# Patient Record
Sex: MALE | Race: WHITE | Employment: FULL TIME | URBAN - METROPOLITAN AREA
[De-identification: names, ages, dates, MRNs, and addresses within clinical notes are randomized per-mention and may not be internally consistent; named-entity substitution may affect disease eponyms.]

---

## 2021-06-25 ENCOUNTER — HOSPITAL ENCOUNTER (INPATIENT)
Age: 46
LOS: 3 days | Discharge: HOME | DRG: 917 | End: 2021-06-28
Attending: STUDENT IN AN ORGANIZED HEALTH CARE EDUCATION/TRAINING PROGRAM | Admitting: INTERNAL MEDICINE
Payer: COMMERCIAL

## 2021-06-25 ENCOUNTER — APPOINTMENT (OUTPATIENT)
Dept: CT IMAGING | Age: 46
DRG: 917 | End: 2021-06-25
Payer: COMMERCIAL

## 2021-06-25 ENCOUNTER — APPOINTMENT (OUTPATIENT)
Dept: GENERAL RADIOLOGY | Age: 46
DRG: 917 | End: 2021-06-25
Payer: COMMERCIAL

## 2021-06-25 DIAGNOSIS — T50.904A DRUG OVERDOSE, UNDETERMINED INTENT, INITIAL ENCOUNTER: Primary | ICD-10-CM

## 2021-06-25 DIAGNOSIS — F19.10 POLYSUBSTANCE ABUSE (HCC): ICD-10-CM

## 2021-06-25 PROBLEM — T50.901A DRUG OVERDOSE, ACCIDENTAL OR UNINTENTIONAL, INITIAL ENCOUNTER: Status: ACTIVE | Noted: 2021-06-25

## 2021-06-25 LAB
ACETAMINOPHEN LEVEL: <5 UG/ML (ref 10–30)
ALBUMIN SERPL-MCNC: 3.9 G/DL (ref 3.4–5)
ALP BLD-CCNC: 90 U/L (ref 40–129)
ALT SERPL-CCNC: 45 U/L (ref 10–40)
AMMONIA: 13 UMOL/L (ref 16–60)
AMPHETAMINE SCREEN, URINE: POSITIVE
ANION GAP SERPL CALCULATED.3IONS-SCNC: 13 MMOL/L (ref 3–16)
ANISOCYTOSIS: ABNORMAL
AST SERPL-CCNC: 28 U/L (ref 15–37)
BARBITURATE SCREEN URINE: ABNORMAL
BASE EXCESS ARTERIAL: 3.4 MMOL/L (ref -3–3)
BASE EXCESS VENOUS: 5.3 MMOL/L
BASOPHILS ABSOLUTE: 0.1 K/UL (ref 0–0.2)
BASOPHILS RELATIVE PERCENT: 0.6 %
BENZODIAZEPINE SCREEN, URINE: POSITIVE
BILIRUB SERPL-MCNC: 0.4 MG/DL (ref 0–1)
BILIRUBIN DIRECT: <0.2 MG/DL (ref 0–0.3)
BILIRUBIN URINE: NEGATIVE
BILIRUBIN, INDIRECT: ABNORMAL MG/DL (ref 0–1)
BLOOD, URINE: NEGATIVE
BUN BLDV-MCNC: 13 MG/DL (ref 7–20)
CALCIUM SERPL-MCNC: 9.3 MG/DL (ref 8.3–10.6)
CANNABINOID SCREEN URINE: ABNORMAL
CARBOXYHEMOGLOBIN ARTERIAL: 1.2 % (ref 0–1.5)
CARBOXYHEMOGLOBIN: 1.3 %
CHLORIDE BLD-SCNC: 103 MMOL/L (ref 99–110)
CLARITY: CLEAR
CO2: 25 MMOL/L (ref 21–32)
COCAINE METABOLITE SCREEN URINE: ABNORMAL
COLOR: ABNORMAL
COMMENT UA: ABNORMAL
CREAT SERPL-MCNC: 0.9 MG/DL (ref 0.9–1.3)
EKG ATRIAL RATE: 56 BPM
EKG DIAGNOSIS: NORMAL
EKG P AXIS: 30 DEGREES
EKG P-R INTERVAL: 174 MS
EKG Q-T INTERVAL: 490 MS
EKG QRS DURATION: 92 MS
EKG QTC CALCULATION (BAZETT): 472 MS
EKG R AXIS: 41 DEGREES
EKG T AXIS: 24 DEGREES
EKG VENTRICULAR RATE: 56 BPM
EOSINOPHILS ABSOLUTE: 0.4 K/UL (ref 0–0.6)
EOSINOPHILS RELATIVE PERCENT: 3.9 %
EPITHELIAL CELLS, UA: 9 /HPF (ref 0–5)
ETHANOL: NORMAL MG/DL (ref 0–0.08)
FERRITIN: 63.2 NG/ML (ref 30–400)
GFR AFRICAN AMERICAN: >60
GFR NON-AFRICAN AMERICAN: >60
GLUCOSE BLD-MCNC: 101 MG/DL (ref 70–99)
GLUCOSE BLD-MCNC: 102 MG/DL (ref 70–99)
GLUCOSE BLD-MCNC: 109 MG/DL (ref 70–99)
GLUCOSE URINE: NEGATIVE MG/DL
HCO3 ARTERIAL: 28.3 MMOL/L (ref 21–29)
HCO3 VENOUS: 32 MMOL/L (ref 23–29)
HCT VFR BLD CALC: 35.9 % (ref 40.5–52.5)
HEMATOLOGY PATH CONSULT: YES
HEMOGLOBIN, ART, EXTENDED: 11.4 G/DL (ref 13.5–17.5)
HEMOGLOBIN: 11 G/DL (ref 13.5–17.5)
HYALINE CASTS: 19 /LPF (ref 0–8)
IRON SATURATION: 9 % (ref 20–50)
IRON: 26 UG/DL (ref 59–158)
KETONES, URINE: NEGATIVE MG/DL
LACTIC ACID: 2.3 MMOL/L (ref 0.4–2)
LEUKOCYTE ESTERASE, URINE: NEGATIVE
LYMPHOCYTES ABSOLUTE: 2.2 K/UL (ref 1–5.1)
LYMPHOCYTES RELATIVE PERCENT: 22.1 %
Lab: ABNORMAL
MCH RBC QN AUTO: 17.6 PG (ref 26–34)
MCHC RBC AUTO-ENTMCNC: 30.6 G/DL (ref 31–36)
MCV RBC AUTO: 57.7 FL (ref 80–100)
METHADONE SCREEN, URINE: POSITIVE
METHEMOGLOBIN ARTERIAL: 0.7 %
METHEMOGLOBIN VENOUS: 0.7 %
MICROCYTES: ABNORMAL
MICROSCOPIC EXAMINATION: YES
MONOCYTES ABSOLUTE: 0.8 K/UL (ref 0–1.3)
MONOCYTES RELATIVE PERCENT: 7.8 %
NEUTROPHILS ABSOLUTE: 6.4 K/UL (ref 1.7–7.7)
NEUTROPHILS RELATIVE PERCENT: 65.6 %
NITRITE, URINE: NEGATIVE
O2 CONTENT ARTERIAL: 16 ML/DL
O2 SAT, ARTERIAL: 98.4 %
O2 SAT, VEN: 43 %
O2 THERAPY: ABNORMAL
O2 THERAPY: ABNORMAL
OPIATE SCREEN URINE: ABNORMAL
OXYCODONE URINE: ABNORMAL
PCO2 ARTERIAL: 42.9 MMHG (ref 35–45)
PCO2, VEN: 54.1 MMHG (ref 40–50)
PDW BLD-RTO: 17.9 % (ref 12.4–15.4)
PERFORMED ON: ABNORMAL
PERFORMED ON: ABNORMAL
PH ARTERIAL: 7.43 (ref 7.35–7.45)
PH UA: 6.5
PH UA: 6.5 (ref 5–8)
PH VENOUS: 7.38 (ref 7.35–7.45)
PHENCYCLIDINE SCREEN URINE: POSITIVE
PLATELET # BLD: 291 K/UL (ref 135–450)
PLATELET SLIDE REVIEW: ADEQUATE
PMV BLD AUTO: 8.4 FL (ref 5–10.5)
PO2 ARTERIAL: 94.2 MMHG (ref 75–108)
PO2, VEN: <30 MMHG
POTASSIUM REFLEX MAGNESIUM: 3.8 MMOL/L (ref 3.5–5.1)
PROCALCITONIN: 0.05 NG/ML (ref 0–0.15)
PROLACTIN: 4.7 NG/ML
PROPOXYPHENE SCREEN: ABNORMAL
PROTEIN UA: 30 MG/DL
RBC # BLD: 6.21 M/UL (ref 4.2–5.9)
RBC UA: 1 /HPF (ref 0–4)
SALICYLATE, SERUM: <0.3 MG/DL (ref 15–30)
SARS-COV-2, NAAT: NOT DETECTED
SLIDE REVIEW: ABNORMAL
SODIUM BLD-SCNC: 141 MMOL/L (ref 136–145)
SPECIFIC GRAVITY UA: 1.03 (ref 1–1.03)
TCO2 ARTERIAL: 29.6 MMOL/L
TCO2 CALC VENOUS: 33 MMOL/L
TOTAL IRON BINDING CAPACITY: 304 UG/DL (ref 260–445)
TOTAL PROTEIN: 7.3 G/DL (ref 6.4–8.2)
TROPONIN: <0.01 NG/ML
URINE REFLEX TO CULTURE: ABNORMAL
URINE TYPE: ABNORMAL
UROBILINOGEN, URINE: 1 E.U./DL
WBC # BLD: 9.7 K/UL (ref 4–11)
WBC UA: 4 /HPF (ref 0–5)

## 2021-06-25 PROCEDURE — 31500 INSERT EMERGENCY AIRWAY: CPT

## 2021-06-25 PROCEDURE — 80048 BASIC METABOLIC PNL TOTAL CA: CPT

## 2021-06-25 PROCEDURE — 85025 COMPLETE CBC W/AUTO DIFF WBC: CPT

## 2021-06-25 PROCEDURE — 82803 BLOOD GASES ANY COMBINATION: CPT

## 2021-06-25 PROCEDURE — 6360000002 HC RX W HCPCS: Performed by: STUDENT IN AN ORGANIZED HEALTH CARE EDUCATION/TRAINING PROGRAM

## 2021-06-25 PROCEDURE — 80307 DRUG TEST PRSMV CHEM ANLYZR: CPT

## 2021-06-25 PROCEDURE — 96376 TX/PRO/DX INJ SAME DRUG ADON: CPT

## 2021-06-25 PROCEDURE — 70450 CT HEAD/BRAIN W/O DYE: CPT

## 2021-06-25 PROCEDURE — 83550 IRON BINDING TEST: CPT

## 2021-06-25 PROCEDURE — 82140 ASSAY OF AMMONIA: CPT

## 2021-06-25 PROCEDURE — 81001 URINALYSIS AUTO W/SCOPE: CPT

## 2021-06-25 PROCEDURE — 99285 EMERGENCY DEPT VISIT HI MDM: CPT

## 2021-06-25 PROCEDURE — 84484 ASSAY OF TROPONIN QUANT: CPT

## 2021-06-25 PROCEDURE — 82077 ASSAY SPEC XCP UR&BREATH IA: CPT

## 2021-06-25 PROCEDURE — 82728 ASSAY OF FERRITIN: CPT

## 2021-06-25 PROCEDURE — 36415 COLL VENOUS BLD VENIPUNCTURE: CPT

## 2021-06-25 PROCEDURE — 2500000003 HC RX 250 WO HCPCS: Performed by: STUDENT IN AN ORGANIZED HEALTH CARE EDUCATION/TRAINING PROGRAM

## 2021-06-25 PROCEDURE — 83540 ASSAY OF IRON: CPT

## 2021-06-25 PROCEDURE — 96375 TX/PRO/DX INJ NEW DRUG ADDON: CPT

## 2021-06-25 PROCEDURE — 71045 X-RAY EXAM CHEST 1 VIEW: CPT

## 2021-06-25 PROCEDURE — 87635 SARS-COV-2 COVID-19 AMP PRB: CPT

## 2021-06-25 PROCEDURE — 83605 ASSAY OF LACTIC ACID: CPT

## 2021-06-25 PROCEDURE — 84146 ASSAY OF PROLACTIN: CPT

## 2021-06-25 PROCEDURE — 2000000000 HC ICU R&B

## 2021-06-25 PROCEDURE — 0BH17EZ INSERTION OF ENDOTRACHEAL AIRWAY INTO TRACHEA, VIA NATURAL OR ARTIFICIAL OPENING: ICD-10-PCS | Performed by: INTERNAL MEDICINE

## 2021-06-25 PROCEDURE — 99291 CRITICAL CARE FIRST HOUR: CPT | Performed by: INTERNAL MEDICINE

## 2021-06-25 PROCEDURE — 93010 ELECTROCARDIOGRAM REPORT: CPT | Performed by: INTERNAL MEDICINE

## 2021-06-25 PROCEDURE — 2700000000 HC OXYGEN THERAPY PER DAY

## 2021-06-25 PROCEDURE — 96374 THER/PROPH/DIAG INJ IV PUSH: CPT

## 2021-06-25 PROCEDURE — 2580000003 HC RX 258: Performed by: STUDENT IN AN ORGANIZED HEALTH CARE EDUCATION/TRAINING PROGRAM

## 2021-06-25 PROCEDURE — 93005 ELECTROCARDIOGRAM TRACING: CPT | Performed by: PHYSICIAN ASSISTANT

## 2021-06-25 PROCEDURE — 80179 DRUG ASSAY SALICYLATE: CPT

## 2021-06-25 PROCEDURE — 94761 N-INVAS EAR/PLS OXIMETRY MLT: CPT

## 2021-06-25 PROCEDURE — 94002 VENT MGMT INPAT INIT DAY: CPT

## 2021-06-25 PROCEDURE — 36600 WITHDRAWAL OF ARTERIAL BLOOD: CPT

## 2021-06-25 PROCEDURE — 5A1945Z RESPIRATORY VENTILATION, 24-96 CONSECUTIVE HOURS: ICD-10-PCS | Performed by: INTERNAL MEDICINE

## 2021-06-25 PROCEDURE — 84145 PROCALCITONIN (PCT): CPT

## 2021-06-25 PROCEDURE — 80076 HEPATIC FUNCTION PANEL: CPT

## 2021-06-25 PROCEDURE — 87040 BLOOD CULTURE FOR BACTERIA: CPT

## 2021-06-25 PROCEDURE — 6360000002 HC RX W HCPCS: Performed by: PHYSICIAN ASSISTANT

## 2021-06-25 PROCEDURE — 80143 DRUG ASSAY ACETAMINOPHEN: CPT

## 2021-06-25 RX ORDER — SODIUM CHLORIDE 9 MG/ML
25 INJECTION, SOLUTION INTRAVENOUS PRN
Status: DISCONTINUED | OUTPATIENT
Start: 2021-06-25 | End: 2021-06-28 | Stop reason: HOSPADM

## 2021-06-25 RX ORDER — PROPOFOL 10 MG/ML
5-50 INJECTION, EMULSION INTRAVENOUS
Status: DISCONTINUED | OUTPATIENT
Start: 2021-06-25 | End: 2021-06-27

## 2021-06-25 RX ORDER — ONDANSETRON 4 MG/1
4 TABLET, ORALLY DISINTEGRATING ORAL EVERY 8 HOURS PRN
Status: DISCONTINUED | OUTPATIENT
Start: 2021-06-25 | End: 2021-06-28 | Stop reason: HOSPADM

## 2021-06-25 RX ORDER — NALOXONE HYDROCHLORIDE 0.4 MG/ML
INJECTION, SOLUTION INTRAMUSCULAR; INTRAVENOUS; SUBCUTANEOUS DAILY PRN
Status: COMPLETED | OUTPATIENT
Start: 2021-06-25 | End: 2021-06-25

## 2021-06-25 RX ORDER — ROCURONIUM BROMIDE 10 MG/ML
INJECTION, SOLUTION INTRAVENOUS DAILY PRN
Status: COMPLETED | OUTPATIENT
Start: 2021-06-25 | End: 2021-06-25

## 2021-06-25 RX ORDER — ONDANSETRON 2 MG/ML
4 INJECTION INTRAMUSCULAR; INTRAVENOUS EVERY 6 HOURS PRN
Status: DISCONTINUED | OUTPATIENT
Start: 2021-06-25 | End: 2021-06-28 | Stop reason: HOSPADM

## 2021-06-25 RX ORDER — NALOXONE HYDROCHLORIDE 1 MG/ML
2 INJECTION INTRAMUSCULAR; INTRAVENOUS; SUBCUTANEOUS ONCE
Status: COMPLETED | OUTPATIENT
Start: 2021-06-25 | End: 2021-06-25

## 2021-06-25 RX ORDER — METHADONE HYDROCHLORIDE 10 MG/ML
80 CONCENTRATE ORAL DAILY
COMMUNITY

## 2021-06-25 RX ORDER — SODIUM CHLORIDE 0.9 % (FLUSH) 0.9 %
5-40 SYRINGE (ML) INJECTION EVERY 12 HOURS SCHEDULED
Status: DISCONTINUED | OUTPATIENT
Start: 2021-06-25 | End: 2021-06-26

## 2021-06-25 RX ORDER — ACETAMINOPHEN 325 MG/1
650 TABLET ORAL EVERY 6 HOURS PRN
Status: DISCONTINUED | OUTPATIENT
Start: 2021-06-25 | End: 2021-06-28 | Stop reason: HOSPADM

## 2021-06-25 RX ORDER — POLYETHYLENE GLYCOL 3350 17 G/17G
17 POWDER, FOR SOLUTION ORAL DAILY PRN
Status: DISCONTINUED | OUTPATIENT
Start: 2021-06-25 | End: 2021-06-28 | Stop reason: HOSPADM

## 2021-06-25 RX ORDER — ACETAMINOPHEN 650 MG/1
650 SUPPOSITORY RECTAL EVERY 6 HOURS PRN
Status: DISCONTINUED | OUTPATIENT
Start: 2021-06-25 | End: 2021-06-28 | Stop reason: HOSPADM

## 2021-06-25 RX ORDER — SODIUM CHLORIDE 0.9 % (FLUSH) 0.9 %
5-40 SYRINGE (ML) INJECTION PRN
Status: DISCONTINUED | OUTPATIENT
Start: 2021-06-25 | End: 2021-06-26

## 2021-06-25 RX ORDER — LIDOCAINE HYDROCHLORIDE 10 MG/ML
5 INJECTION, SOLUTION EPIDURAL; INFILTRATION; INTRACAUDAL; PERINEURAL ONCE
Status: DISCONTINUED | OUTPATIENT
Start: 2021-06-25 | End: 2021-06-26

## 2021-06-25 RX ORDER — SODIUM CHLORIDE 9 MG/ML
25 INJECTION, SOLUTION INTRAVENOUS PRN
Status: DISCONTINUED | OUTPATIENT
Start: 2021-06-25 | End: 2021-06-26

## 2021-06-25 RX ORDER — SODIUM CHLORIDE 0.9 % (FLUSH) 0.9 %
5-40 SYRINGE (ML) INJECTION EVERY 12 HOURS SCHEDULED
Status: DISCONTINUED | OUTPATIENT
Start: 2021-06-25 | End: 2021-06-28 | Stop reason: HOSPADM

## 2021-06-25 RX ORDER — ETOMIDATE 2 MG/ML
INJECTION INTRAVENOUS DAILY PRN
Status: COMPLETED | OUTPATIENT
Start: 2021-06-25 | End: 2021-06-25

## 2021-06-25 RX ORDER — SODIUM CHLORIDE 0.9 % (FLUSH) 0.9 %
5-40 SYRINGE (ML) INJECTION PRN
Status: DISCONTINUED | OUTPATIENT
Start: 2021-06-25 | End: 2021-06-28 | Stop reason: HOSPADM

## 2021-06-25 RX ADMIN — ROCURONIUM BROMIDE 100 MG: 10 INJECTION INTRAVENOUS at 12:59

## 2021-06-25 RX ADMIN — PROPOFOL 50 MCG/KG/MIN: 10 INJECTION, EMULSION INTRAVENOUS at 22:37

## 2021-06-25 RX ADMIN — SODIUM CHLORIDE 3000 MG: 900 INJECTION INTRAVENOUS at 14:38

## 2021-06-25 RX ADMIN — NALOXONE HYDROCHLORIDE 4 MG: 0.4 INJECTION, SOLUTION INTRAMUSCULAR; INTRAVENOUS; SUBCUTANEOUS at 12:55

## 2021-06-25 RX ADMIN — PROPOFOL 50 MCG/KG/MIN: 10 INJECTION, EMULSION INTRAVENOUS at 16:40

## 2021-06-25 RX ADMIN — PROPOFOL 50 MCG/KG/MIN: 10 INJECTION, EMULSION INTRAVENOUS at 18:43

## 2021-06-25 RX ADMIN — NALOXONE HYDROCHLORIDE 2 MG: 1 INJECTION PARENTERAL at 12:35

## 2021-06-25 RX ADMIN — NALOXONE HYDROCHLORIDE 2 MG: 1 INJECTION PARENTERAL at 12:53

## 2021-06-25 RX ADMIN — ETOMIDATE 30 MG: 2 INJECTION INTRAVENOUS at 12:58

## 2021-06-25 RX ADMIN — PROPOFOL 10 MCG/KG/MIN: 10 INJECTION, EMULSION INTRAVENOUS at 14:05

## 2021-06-25 RX ADMIN — PROPOFOL 40 MCG/KG/MIN: 10 INJECTION, EMULSION INTRAVENOUS at 20:08

## 2021-06-25 ASSESSMENT — PULMONARY FUNCTION TESTS
PIF_VALUE: 18
PIF_VALUE: 14
PIF_VALUE: 22
PIF_VALUE: 16
PIF_VALUE: 21
PIF_VALUE: 21
PIF_VALUE: 17
PIF_VALUE: 22
PIF_VALUE: 21
PIF_VALUE: 21

## 2021-06-25 NOTE — H&P
Hospital Medicine History & Physical      PCP: No primary care provider on file. Date of Admission: 6/25/2021    Date of Service: Pt seen/examined on 6/25/2021    Chief Complaint:      Chief Complaint   Patient presents with    Drug Overdose     unknown substance, has HX of heroin use, now uses suboxone. family gave pt 12mg narcan. Pt still remains altered. History Of Present Illness:     58-year-old male with past history of polysubstance abuse, hypertension with noncompliance of medication presented to the hospital altered from home. Patient was unable to provide any information on admission to the hospital.  Apparently he has been feeling sick with bilateral lower extremity swelling and shortness of breath. He was supposed to see his doctor today but was found confused in his room. He was given 12 mg of nasal Narcan by family with no significant improvement. He was brought to the hospital and he progressively became more altered requiring intubation. Drug screen came back positive for amphetamines, benzos, methadone and PCP. Patient will be admitted to the hospital for further work-up and management    Past Medical History:    No past medical history on file. Past Surgical History:    No past surgical history on file. Medications Prior to Admission:    Prior to Admission medications    Medication Sig Start Date End Date Taking? Authorizing Provider   methadone (DOLOPHINE) 10 MG/ML solution Take by mouth daily. Yes Historical Provider, MD       Allergies:  Patient has no known allergies. Social History:           Family History:  Reviewed in detail and negative for DM, Early CAD, Cancer, CVA. No family history on file. REVIEW OF SYSTEMS:   Positive review  noted in the HPI. All other systems reviewed and negative.     PHYSICAL EXAM:    BP (!) 177/105   Pulse 80   Temp 97.7 °F (36.5 °C) (Oral)   Resp 16   Wt (!) 377 lb 10.4 oz (171.3 kg)   SpO2 100%   General Appearance: Intubated, sedated  Skin: warm and dry, no rash or erythema  HEENT: Normocephalic, atraumatic, PERRLA, intubated  Neck: supple and non-tender without mass, no thyromegaly or thyroid nodules, no cervical lymphadenopathy  Pulmonary/Chest: clear to auscultation bilaterally- no wheezes, rales or rhonchi, normal air movement, no respiratory distress  Cardiovascular: normal rate, regular rhythm, normal S1 and S2, no murmurs, rubs, clicks, or gallops, Peripheral pulses good, Cap refill <3 sec, no carotid bruits  Abdomen: soft, non-tender, non-distended, normal bowel sounds, no masses or organomegaly  Extremities: Bilateral lower extremity 2+ edema  Musculoskeletal: normal range of motion, no joint swelling, deformity or tenderness  Neurologic: r sedated and intubated      LABS:    CBC   Recent Labs     06/25/21  1244   WBC 9.7   HGB 11.0*   HCT 35.9*         RENAL  Recent Labs     06/25/21  1244      K 3.8      CO2 25   BUN 13   CREATININE 0.9     LFT'S  Recent Labs     06/25/21  1244   AST 28   ALT 45*   BILIDIR <0.2   BILITOT 0.4   ALKPHOS 90     COAG  No results for input(s): INR in the last 72 hours.   CARDIAC ENZYMES  Recent Labs     06/25/21  1244   TROPONINI <0.01       U/A:    Lab Results   Component Value Date    COLORU DK YELLOW 06/25/2021    WBCUA 4 06/25/2021    RBCUA 1 06/25/2021    CLARITYU Clear 06/25/2021    SPECGRAV 1.027 06/25/2021    LEUKOCYTESUR Negative 06/25/2021    BLOODU Negative 06/25/2021    GLUCOSEU Negative 06/25/2021       ABG    Lab Results   Component Value Date    DHC2UVO 28.3 06/25/2021    BEART 3.4 06/25/2021    Z7WWERRT 98.4 06/25/2021    PHART 7.427 06/25/2021    WIZ4GYV 42.9 06/25/2021    PO2ART 94.2 06/25/2021    AKE9TTO 29.6 06/25/2021       UA:  Recent Labs     06/25/21  1312   COLORU DK YELLOW   PHUR 6.5  6.5   WBCUA 4   RBCUA 1   CLARITYU Clear   SPECGRAV 1.027   LEUKOCYTESUR Negative   UROBILINOGEN 1.0   BILIRUBINUR Negative   BLOODU Negative   GLUCOSEU Negative

## 2021-06-25 NOTE — PROGRESS NOTES
Patient moved to room 14 for intubation. Patient intubated with an 8.0 26 lip. + color change and bilateral breath sounds. Patient placed on vent.  ACVC/24/450/50%/+5    Electronically signed by [employer] on 6/25/2021 at 1:07 PM

## 2021-06-25 NOTE — ED NOTES
Patient did not respond to Narcan. Dr Frey Necessary at bedside.       Jazmyn Ling, RN  06/25/21 2872

## 2021-06-25 NOTE — ED NOTES
Respiratory called, will be here shortly to transport patient to ICU     Gillian Mi RN  06/25/21 9748

## 2021-06-25 NOTE — ED PROVIDER NOTES
MidLevel Attestation   I havepersonally performed and/or participated in the history, exam and medical decision making and agree with all pertinent clinical information. I have also reviewed and agree with the past medical, family and social historyunless otherwise noted. I have personally performed a face to face diagnostic evaluation onthis patient. I have reviewed the mid-levels findings and agree. In brief, Karla Bonds is a 51 y.o. male that presented to the emergency department with   Chief Complaint   Patient presents with    Drug Overdose     unknown substance, has HX of heroin use, now uses suboxone. family gave pt 12mg narcan. Pt still remains altered. .  CONSTITUTIONAL: Well appearing, in no acute distress   EYES: PERRL, No injection, discharge or scleral icterus. NECK: Normal ROM, NO LAD and Moist mucous membranes. CARDIOVASCULAR: Regular rate and rhythm. No murmurs or gallop. PULMONARY/CHEST: Airway patent. No retractions. Breath sounds clear with good air entry bilaterally. ABDOMEN: Soft, Non-distended and non-tender, without guarding or rebound. SKIN: Acyanotic, warm, dry   MUSCULOSKELETAL: No swelling, tenderness or deformity   NEUROLOGICAL: Awake. Pulses intact. Grossly nonfocal     71-year-old gentleman with history of IV drug use presenting today altered. Patient was unable to provide information and history was obtained from parents. They indicated patient came back from work yesterday stating that he was not feeling well. Today he was found in his room confused. He was given 12 mg of nasal Narcan with no significant improvement. On exam his pupils were pinpoint. He was given additional doses of 4 mg of Narcan with no improvement. As a result for airway protection he was intubated. Labs have been obtained so far abnormal for polysubstance abuse which I believe is the cause of his symptoms.   CT of the head is unremarkable chest x-ray concerning for aspiration pneumonia. Patient started on Unasyn. Patient is currently intubated on propofol and he will need ICU admission for further evaluation and treatment. EKG by my preliminary interpretation shows sinus rhythm with rate of 56, normal axis, normal intervals, with no ST changes indicative of ischemia at this time. Rapid sequence intubation. Indication for the procedure was respiratory failure airway protection. The patient was preoxygenated with 100% oxygen by face mask. Medications used: Etomidate and rocuronium  Description: The patient was orally endotracheally intubated under video laryngoscopy with a 7.5 ETT. In line stabilization was not necessary during the procedure. Tracheal intubation was confirmed with breath sounds were auscultated equally bilaterally; appropriate color change with end tidal CO2 detector and waveform capnography. Pulse oximetry remained above INSERT%. The patient tolerated the procedure well with no immediate complications.    Post procedure X-ray: yes   Performed by: Noy Noyola    I have reviewed and interpreted all of the currently available lab results and diagnostics from this visit:  Results for orders placed or performed during the hospital encounter of 06/25/21   SARS-CoV-2 NAAT (Rapid)    Specimen: Nasopharyngeal Swab   Result Value Ref Range    SARS-CoV-2, NAAT Not Detected Not Detected   CBC Auto Differential   Result Value Ref Range    WBC 9.7 4.0 - 11.0 K/uL    RBC 6.21 (H) 4.20 - 5.90 M/uL    Hemoglobin 11.0 (L) 13.5 - 17.5 g/dL    Hematocrit 35.9 (L) 40.5 - 52.5 %    MCV 57.7 (L) 80.0 - 100.0 fL    MCH 17.6 (L) 26.0 - 34.0 pg    MCHC 30.6 (L) 31.0 - 36.0 g/dL    RDW 17.9 (H) 12.4 - 15.4 %    Platelets 171 489 - 387 K/uL    MPV 8.4 5.0 - 10.5 fL    PLATELET SLIDE REVIEW Adequate     SLIDE REVIEW see below     Path Consult Yes     Neutrophils % 65.6 %    Lymphocytes % 22.1 %    Monocytes % 7.8 %    Eosinophils % 3.9 %    Basophils % 0.6 %    Neutrophils Absolute 6.4 1.7 - 7.7 K/uL    Lymphocytes Absolute 2.2 1.0 - 5.1 K/uL    Monocytes Absolute 0.8 0.0 - 1.3 K/uL    Eosinophils Absolute 0.4 0.0 - 0.6 K/uL    Basophils Absolute 0.1 0.0 - 0.2 K/uL    Anisocytosis 1+ (A)     Microcytes 3+ (A)    Basic Metabolic Panel w/ Reflex to MG   Result Value Ref Range    Sodium 141 136 - 145 mmol/L    Potassium reflex Magnesium 3.8 3.5 - 5.1 mmol/L    Chloride 103 99 - 110 mmol/L    CO2 25 21 - 32 mmol/L    Anion Gap 13 3 - 16    Glucose 102 (H) 70 - 99 mg/dL    BUN 13 7 - 20 mg/dL    CREATININE 0.9 0.9 - 1.3 mg/dL    GFR Non-African American >60 >60    GFR African American >60 >60    Calcium 9.3 8.3 - 10.6 mg/dL   Hepatic Function Panel   Result Value Ref Range    Total Protein 7.3 6.4 - 8.2 g/dL    Albumin 3.9 3.4 - 5.0 g/dL    Alkaline Phosphatase 90 40 - 129 U/L    ALT 45 (H) 10 - 40 U/L    AST 28 15 - 37 U/L    Total Bilirubin 0.4 0.0 - 1.0 mg/dL    Bilirubin, Direct <0.2 0.0 - 0.3 mg/dL    Bilirubin, Indirect see below 0.0 - 1.0 mg/dL   Troponin   Result Value Ref Range    Troponin <0.01 <0.01 ng/mL   Blood Gas, Venous   Result Value Ref Range    pH, Luis 7.377 7.350 - 7.450    pCO2, Luis 54.1 (H) 40.0 - 50.0 mmHg    pO2, Luis <30 Not Established mmHg    HCO3, Venous 32 (H) 23 - 29 mmol/L    Base Excess, Luis 5.3 Not Established mmol/L    O2 Sat, Luis 43 Not Established %    Carboxyhemoglobin 1.3 %    MetHgb, Luis 0.7 <1.5 %    TC02 (Calc), Luis 33 Not Established mmol/L    O2 Therapy Unknown    Urinalysis Reflex to Culture    Specimen: Urine, clean catch   Result Value Ref Range    Color, UA DK YELLOW Straw/Yellow    Clarity, UA Clear Clear    Glucose, Ur Negative Negative mg/dL    Bilirubin Urine Negative Negative    Ketones, Urine Negative Negative mg/dL    Specific Gravity, UA 1.027 1.005 - 1.030    Blood, Urine Negative Negative    pH, UA 6.5 5.0 - 8.0    Protein, UA 30 (A) Negative mg/dL    Urobilinogen, Urine 1.0 <2.0 E.U./dL    Nitrite, Urine Negative Negative    Leukocyte Esterase, Urine /LPF    WBC, UA 4 0 - 5 /HPF    RBC, UA 1 0 - 4 /HPF    Epithelial Cells, UA 9 (H) 0 - 5 /HPF   Ferritin   Result Value Ref Range    Ferritin 63.2 30.0 - 400.0 ng/mL   Iron and TIBC   Result Value Ref Range    Iron 26 (L) 59 - 158 ug/dL    TIBC 304 260 - 445 ug/dL    Iron Saturation 9 (L) 20 - 50 %   Lactic Acid, Plasma   Result Value Ref Range    Lactic Acid 1.1 0.4 - 2.0 mmol/L   Basic Metabolic Panel   Result Value Ref Range    Sodium 142 136 - 145 mmol/L    Potassium 3.6 3.5 - 5.1 mmol/L    Chloride 105 99 - 110 mmol/L    CO2 29 21 - 32 mmol/L    Anion Gap 8 3 - 16    Glucose 137 (H) 70 - 99 mg/dL    BUN 8 7 - 20 mg/dL    CREATININE 0.7 (L) 0.9 - 1.3 mg/dL    GFR Non-African American >60 >60    GFR African American >60 >60    Calcium 8.5 8.3 - 10.6 mg/dL   CBC Auto Differential   Result Value Ref Range    WBC 8.7 4.0 - 11.0 K/uL    RBC 6.11 (H) 4.20 - 5.90 M/uL    Hemoglobin 10.9 (L) 13.5 - 17.5 g/dL    Hematocrit 35.1 (L) 40.5 - 52.5 %    MCV 57.5 (L) 80.0 - 100.0 fL    MCH 17.8 (L) 26.0 - 34.0 pg    MCHC 30.9 (L) 31.0 - 36.0 g/dL    RDW 17.8 (H) 12.4 - 15.4 %    Platelets 798 740 - 382 K/uL    MPV 8.3 5.0 - 10.5 fL    Neutrophils % 64.8 %    Lymphocytes % 25.2 %    Monocytes % 5.1 %    Eosinophils % 4.5 %    Basophils % 0.4 %    Neutrophils Absolute 5.6 1.7 - 7.7 K/uL    Lymphocytes Absolute 2.2 1.0 - 5.1 K/uL    Monocytes Absolute 0.4 0.0 - 1.3 K/uL    Eosinophils Absolute 0.4 0.0 - 0.6 K/uL    Basophils Absolute 0.0 0.0 - 0.2 K/uL   Lactic Acid, Plasma   Result Value Ref Range    Lactic Acid 1.7 0.4 - 2.0 mmol/L   Lactic Acid, Plasma   Result Value Ref Range    Lactic Acid 0.8 0.4 - 2.0 mmol/L   Magnesium   Result Value Ref Range    Magnesium 2.00 1.80 - 2.40 mg/dL   Phosphorus   Result Value Ref Range    Phosphorus 3.0 2.5 - 4.9 mg/dL   Brain Natriuretic Peptide   Result Value Ref Range    Pro-BNP 89 0 - 124 pg/mL   POCT Glucose   Result Value Ref Range    POC Glucose 101 (H) 70 - 99 mg/dl    Performed on ACCU-CHEK    POCT Glucose   Result Value Ref Range    POC Glucose 109 (H) 70 - 99 mg/dl    Performed on ACCU-CHEK    POCT Glucose   Result Value Ref Range    POC Glucose 98 70 - 99 mg/dl    Performed on ACCU-CHEK    POCT Glucose   Result Value Ref Range    POC Glucose 37 (LL) 70 - 99 mg/dl    Performed on ACCU-CHEK    POCT Glucose   Result Value Ref Range    POC Glucose 53 (L) 70 - 99 mg/dl    Performed on ACCU-CHEK    POCT Glucose   Result Value Ref Range    POC Glucose 51 (L) 70 - 99 mg/dl    Performed on ACCU-CHEK    POCT Glucose   Result Value Ref Range    POC Glucose 85 70 - 99 mg/dl    Performed on ACCU-CHEK    POCT Glucose   Result Value Ref Range    POC Glucose 72 70 - 99 mg/dl    Performed on ACCU-CHEK    POCT Glucose   Result Value Ref Range    POC Glucose 77 70 - 99 mg/dl    Performed on ACCU-CHEK    POCT Glucose   Result Value Ref Range    POC Glucose 65 (L) 70 - 99 mg/dl    Performed on ACCU-CHEK    POCT Glucose   Result Value Ref Range    POC Glucose 76 70 - 99 mg/dl    Performed on ACCU-CHEK    POCT Glucose   Result Value Ref Range    POC Glucose 94 70 - 99 mg/dl    Performed on ACCU-CHEK    EKG 12 Lead   Result Value Ref Range    Ventricular Rate 56 BPM    Atrial Rate 56 BPM    P-R Interval 174 ms    QRS Duration 92 ms    Q-T Interval 490 ms    QTc Calculation (Bazett) 472 ms    P Axis 30 degrees    R Axis 41 degrees    T Axis 24 degrees    Diagnosis       Sinus bradycardia with sinus arrhythmiaOtherwise normal ECGNo previous ECGs availableConfirmed by FAHAD LAGOS, Jolanta Salinas ([id number]) on 6/25/2021 1:09:33 PM     CT Head WO Contrast    Result Date: 6/25/2021  EXAMINATION: CT OF THE HEAD WITHOUT CONTRAST  6/25/2021 12:53 pm TECHNIQUE: CT of the head was performed without the administration of intravenous contrast. Dose modulation, iterative reconstruction, and/or weight based adjustment of the mA/kV was utilized to reduce the radiation dose to as low as reasonably achievable. COMPARISON: None.  HISTORY: ORDERING SYSTEM PROVIDED HISTORY: AMS TECHNOLOGIST PROVIDED HISTORY: Reason for exam:->AMS Has a \"code stroke\" or \"stroke alert\" been called? ->No Decision Support Exception - unselect if not a suspected or confirmed emergency medical condition->Emergency Medical Condition (MA) FINDINGS: BRAIN/VENTRICLES: There is no acute intracerebral hemorrhage or extra-axial fluid collection. The ventricles and sulci are within normal limits. ORBITS: The orbits are unremarkable. There is a dysconjugate gaze. SINUSES: Mild mucosal hypertrophy involves the bilateral maxillary sinuses and ethmoid air cells. Opacification involves the left nasal cavity. There is partial opacification of the left mastoid air cells. SOFT TISSUES/SKULL:  The calvarium is intact. 1. No acute intracranial abnormality. XR CHEST PORTABLE    Result Date: 6/25/2021  EXAMINATION: ONE XRAY VIEW OF THE CHEST 6/25/2021 4:20 pm COMPARISON: Chest x-ray dated 06/25/2021 at 1:18 p.m. HISTORY: ORDERING SYSTEM PROVIDED HISTORY: post intubation TECHNOLOGIST PROVIDED HISTORY: Reason for exam:->post intubation Reason for Exam: post ETT/OG Acuity: Acute Type of Exam: Initial FINDINGS: LINES/TUBES/OTHER: Endotracheal tube is noted with its tip approximately 2.2 cm from the kevin in satisfactory position. Enteric tube is noted coursing below the level of the diaphragm with its tip and side port within the stomach. HEART/MEDIASTINUM: The cardiomediastinal silhouette is stable. PLEURA/LUNGS: There are low lung volumes. There is elevation of the right hemidiaphragm. There are increased interstitial markings which may be due to vascular crowding from low lung volumes versus pulmonary vascular congestion. Atypical pneumonia cannot be excluded. There are no focal consolidations or pleural effusions. There is no appreciable pneumothorax. BONES/SOFT TISSUE: No acute abnormality. Endotracheal tube and enteric tube in satisfactory position.  No change in increased interstitial markings which may be due to vascular crowding from low lung volumes versus pulmonary vascular congestion. Atypical pneumonia cannot be excluded. XR CHEST PORTABLE    Result Date: 6/25/2021  EXAMINATION: ONE XRAY VIEW OF THE CHEST 6/25/2021 12:37 pm COMPARISON: None. HISTORY: ORDERING SYSTEM PROVIDED HISTORY: OD TECHNOLOGIST PROVIDED HISTORY: Reason for exam:->OD Reason for Exam: OD FINDINGS: Low lung volume portable chest x-ray in expiratory phase. Faint bilateral pulmonary opacities are present. No focal consolidation. No pneumothorax or pleural effusion or acute findings of the cardiac or mediastinal structures     Faint bilateral interstitial opacities favored to be vascular congestion and possibly mild superimposed interstitial edema although infection or aspiration could potentially have chest x-ray appearance.        ED Medication Orders (From admission, onward)    Start Ordered     Status Ordering Provider    06/26/21 1000 06/26/21 0857  methadone (DOLOPHINE) tablet 80 mg  DAILY      Last MAR action: Given - by Mary Kothari on 06/26/21 at [facility], KEREN P    06/26/21 0930 06/26/21 0856  enoxaparin (LOVENOX) injection 40 mg  2 TIMES DAILY      Last MAR action: Given - by Mary Kothari on 06/26/21 at [address] Verneita Ripper E    06/26/21 0915 06/26/21 0857  dexmedetomidine (PRECEDEX) 400 mcg in sodium chloride 0.9 % 100 mL infusion  CONTINUOUS      Ordered HEIDY SHAIKH E    06/26/21 0900 06/26/21 0717  chlorhexidine (PERIDEX) 0.12 % solution 15 mL  2 TIMES DAILY      Last MAR action: Given - by Mary Kothari on 06/26/21 at [address] [facility] [place], KEREN P    06/26/21 0900 06/26/21 0717  famotidine (PEPCID) injection 20 mg  2 TIMES DAILY      Last MAR action: Given - by Mary Kothari on 06/26/21 at [address] [place], KEREN P    06/26/21 0702 06/26/21 0639  midazolam (VERSED) injection 2 mg  EVERY 1 HOUR PRN      Last MAR action: Given - by Mary Kothari on 06/26/21 at [facility] Gus Jewels F    06/26/21 0700 06/26/21 0635  fentaNYL 10 mcg/mL infusion  CONTINUOUS      Last MAR action: Rate/Dose Change - by Eduarda Shook on 06/26/21 at 0822 Richard Parikh E    06/26/21 5810 06/26/21 0635  fentaNYL (SUBLIMAZE) injection 25 mcg  EVERY 1 HOUR PRN      Last MAR action: Given - by Angel Torres on 06/26/21 at 0638 Gus MORE    06/26/21 0425 06/26/21 0426  glucose (GLUTOSE) 40 % oral gel 15 g  PRN      Acknowledged ROSE SHULTZ    06/26/21 0425 06/26/21 0426  dextrose 50 % IV solution  PRN      Last MAR action: [facility]    06/26/21 0425 06/26/21 0426  glucagon (rDNA) injection 1 mg  PRN      Acknowledged ROSE SHULTZ    06/26/21 0425 06/26/21 0426  dextrose 5 % solution  PRN      Last MAR action: New Bag - by Eduarda Shook on 06/26/21 at 0747 Gus MORE    06/25/21 2100 06/25/21 1601  sodium chloride flush 0.9 % injection 5-40 mL  2 times per day      Last MAR action: Given - by Eduarda Shook on 06/26/21 at [address] KEREN Graves P    06/25/21 1710 06/25/21 1710  perflutren lipid microspheres (DEFINITY) injection 1.65 mg  IMG ONCE PRN      Acknowledged DANGELO JESSICA    06/25/21 1604 06/25/21 1604  ondansetron (ZOFRAN-ODT) disintegrating tablet 4 mg  EVERY 8 HOURS PRN      Acknowledged DANGELO JESSICA    06/25/21 1604 06/25/21 1604  ondansetron (ZOFRAN) injection 4 mg  EVERY 6 HOURS PRN      Acknowledged DANGELO JESSICA    06/25/21 1604 06/25/21 1604  polyethylene glycol (GLYCOLAX) packet 17 g  DAILY PRN      Acknowledged DANGELO JESSICA    06/25/21 1604 06/25/21 1604  acetaminophen (TYLENOL) tablet 650 mg  EVERY 6 HOURS PRN      Acknowledged DANGELO JESSICA    06/25/21 1604 06/25/21 1604  acetaminophen (TYLENOL) suppository 650 mg  EVERY 6 HOURS PRN      Acknowledged DANGELO JESSICA    06/25/21 1600 06/25/21 1601  sodium chloride flush 0.9 % injection 5-40 mL  PRN      Acknowledged KEREN TORIBIO 06/25/21 1600 06/25/21 1601  0.9 % sodium chloride infusion  PRN      Acknowledged KEREN TORIBIO HILARY    06/25/21 1415 06/25/21 1401  propofol injection  TITRATED      Last MAR action: New Bag - by Mary Kothari on 06/26/21 at 0947 Verneita Ripper E    06/25/21 1415 06/25/21 1403  ampicillin-sulbactam (UNASYN) 3000 mg ivpb minibag  ONCE     Question:  Antimicrobial Indications  Answer:  Aspiration Pneumonia    Last MAR action: Stopped - by Susan Niki on 06/25/21 at 1521 RAD[facility], NSEHNIITOOH A    06/25/21 1300 06/25/21 1253  naloxone (NARCAN) injection 2 mg  ONCE      Last MAR action: Given - by Garrett Carrillo on 06/25/21 at 1253 [facility], NSEHNIITOOH A    06/25/21 1259 06/25/21 1259  rocuronium (ZEMURON) injection  DAILY PRN      Last MAR action: Given - by Cynthia Cody on 06/25/21 at 1259 [facility], NSEHNIITOOH A    06/25/21 1258 06/25/21 1259  etomidate (AMIDATE) injection  DAILY PRN      Last MAR action: Given - by Cynthia Cody on 06/25/21 at 1258 [facility], NSEHNIITOOH A    06/25/21 1255 06/25/21 1300  naloxone (NARCAN) injection  DAILY PRN      Last MAR action: Given - by Cynthia Cody on 06/25/21 at 1255 [facility], NSEHNIITOOH A    06/25/21 1230 06/25/21 1226  naloxone (NARCAN) injection 2 mg  ONCE      Last MAR action: Given - by Garrett Carrillo on 06/25/21 at [facility] 159 M          Final Impression      1. Drug overdose, undetermined intent, initial encounter    2.  Polysubstance abuse ([facility] [address]        DISPOSITION           Amount and/or Complexity of Data Reviewed:  Clinical lab tests: ordered and reviewed   Tests in the radiology section of CPT®: ordered and reviewed   Tests in the medicine section of CPT®: ordered and reviewed   Decide to obtain previous medical records or to obtain history from someone other than the patient: yes  Obtain history from someone other than the patient: yes  Review and summarize past medical records:yes  I looked up the patient in our electronic medical record:yes  Discuss the patient with other providers:yes  Independent visualization of images, tracings, or specimens:yes  Risk of Complications, Morbidity, and/or Mortality:Moderate  Presenting problems: moderate Diagnostic procedures: moderate yes Management options: moderate     Critical Care Attestation   Total critical care time: 45 minutes minimum   Critical care time does not include separately billable procedures and treating other patients. Critical care was necessary to treat or prevent imminent or life-threatening deterioration of the following conditions: Acute encephalopathy with respiratory failure from polysubstance abuse. Patient emergently intubated in the emergency room after given multiple doses of Narcan with no relief. case discussed with consultants. This record is transcribed utilizing voice recognition technology. There are inherent limitations in this technology. In addition, there may be limitations in editing of this report. If there are any discrepancies, please contact me directly.     Kathia Sutherland MD   6/26/2021         Vidal Shannon MD  06/26/21 4583 52y/o F w/ h/o T2DM, HTN, CVA (R ICA stenosis w/ L hemiparesis) p/w L sided weakness over 24h, exam L sided lower extremity weakness. Outside of window for tPA and thrombectomy. Plan for code stroke labs and imaging. 50y/o F w/ h/o T2DM, HTN, CVA (R ICA stenosis w/ L hemiparesis) p/w L sided weakness for over 24h, exam L sided lower extremity weakness. Outside of window for tPA and thrombectomy. Plan for code stroke labs and imaging. If back to baseline will ambulate, PO challenge and dispo as per work up.

## 2021-06-25 NOTE — ED NOTES
Bed: B-08  Expected date: 6/25/21  Expected time:   Means of arrival: [facility]  Comments:  52 yo OD     Jaimie Smith RN  06/25/21 1044

## 2021-06-25 NOTE — ED PROVIDER NOTES
[address] [facility]        Pt Name: Layne Leigh  MRN: [id number]  [place] 1975  Date of evaluation: 6/25/2021  Provider: VANDANA Manuel  PCP: No primary care provider on file. Note Started: 2:50 PM EDT        I have seen and evaluated this patient with my supervising physician Angie Damon MD.    CHIEF COMPLAINT       Chief Complaint   Patient presents with    Drug Overdose     unknown substance, has HX of heroin use, now uses suboxone. family gave pt 12mg narcan. Pt still remains altered. HISTORY OF PRESENT ILLNESS   (Location, Timing/Onset, Context/Setting, Quality, Duration, Modifying Factors, Severity, Associated Signs and Symptoms)  Note limiting factors. Chief Complaint: JESSI Molina is a 51 y.o. male who presents to emergency department today with complaints of a drug overdose. The patient is unable to provide history due to altered mental status. History is obtained via EMS, and later his daughter. The daughter reports that he does use fentanyl. He is on methadone 80 mg a day, he has not yet gone to the methadone clinic this morning. The patient's daughter reports that he also will use fentanyl, cocaine, in addition to heroin on occasion. She is not sure what he took today. Nursing Notes were all reviewed and agreed with or any disagreements were addressed in the HPI. REVIEW OF SYSTEMS    (2-9 systems for level 4, 10 or more for level 5)     Review of Systems   Unable to perform ROS: Patient unresponsive       Positives and Pertinent negatives as per HPI. Except as noted above in the ROS, all other systems were reviewed and negative. PAST MEDICAL HISTORY   No past medical history on file. SURGICAL HISTORY   No past surgical history on file.       [address]       Current Discharge Medication List      CONTINUE these medications which have NOT CHANGED    Details   methadone (DOLOPHINE) 10 MG/ML solution Take by mouth daily. ALLERGIES     Patient has no known allergies. FAMILYHISTORY     No family history on file. SOCIAL HISTORY       Social History     Tobacco Use    Smoking status: Not on file   Substance Use Topics    Alcohol use: Not on file    Drug use: Not on file       SCREENINGS    Kiel Coma Scale  Eye Opening: To pain  Best Verbal Response: None  Best Motor Response: Withdraws from pain  [place] Coma Scale Score: 7        PHYSICAL EXAM    (up to 7 for level 4, 8 or more for level 5)     ED Triage Vitals [06/25/21 1212]   BP Temp Temp src Pulse Resp SpO2 Height Weight   (!) 173/90 98 °F (36.7 °C) -- 66 19 99 % -- (!) 377 lb 10.4 oz (171.3 kg)       Physical Exam  Vitals and nursing note reviewed. Constitutional:       Appearance: He is well-developed. He is obese. He is ill-appearing. He is not toxic-appearing or diaphoretic. Comments: Mumbles with sternal rub, but does not follow commands or answer questions. HENT:      Head: Normocephalic and atraumatic. Mouth/Throat:      Mouth: Mucous membranes are dry. Dentition: Abnormal dentition. Dental caries present. Pharynx: Oropharynx is clear. Eyes:      Conjunctiva/sclera: Conjunctivae normal.      Pupils: Pupils are equal, round, and reactive to light. Comments: Pupils small, but reactive   Cardiovascular:      Rate and Rhythm: Regular rhythm. Tachycardia present. Pulmonary:      Effort: No respiratory distress. Breath sounds: Normal breath sounds. Abdominal:      General: Bowel sounds are normal.      Palpations: Abdomen is soft. Skin:     General: Skin is warm and dry. Neurological:      Mental Status: He is lethargic.          DIAGNOSTIC RESULTS   LABS:    Labs Reviewed   CBC WITH AUTO DIFFERENTIAL - Abnormal; Notable for the following components:       Result Value    RBC 6.21 (*)     Hemoglobin 11.0 (*)     Hematocrit 35.9 (*)     MCV 57.7 (*)     MCH 17.6 (*)     MCHC 30.6 (*)     RDW 17.9 (*)     Anisocytosis 1+ (*)     Microcytes 3+ (*)     All other components within normal limits    Narrative:     Performed at:  [address] [place]   Phone [phone]   BASIC METABOLIC PANEL W/ REFLEX TO MG FOR LOW K - Abnormal; Notable for the following components:    Glucose 102 (*)     All other components within normal limits    Narrative:     Performed at:  [address] [place]   Phone [phone]   HEPATIC FUNCTION PANEL - Abnormal; Notable for the following components:    ALT 45 (*)     All other components within normal limits    Narrative:     Performed at:  [address] [place]   Phone [phone]   BLOOD GAS, VENOUS - Abnormal; Notable for the following components:    pCO2, Luis 54.1 (*)     HCO3, Venous 32 (*)     All other components within normal limits    Narrative:     Performed at:  [address] [place]   Phone [phone]   URINE RT REFLEX TO CULTURE - Abnormal; Notable for the following components:    Protein, UA 30 (*)     All other components within normal limits    Narrative:     Performed at:  [address] [facility] [place]   Phone [phone]   [address] - Abnormal; Notable for the following components:    Amphetamine Screen, Urine POSITIVE (*)     Benzodiazepine Screen, Urine POSITIVE (*)     PCP Screen, Urine POSITIVE (*)     Methadone Screen, Urine POSITIVE (*)     All other components within normal limits    Narrative:     Performed at:  [address] [facility] [place]   Phone [phone]   ACETAMINOPHEN LEVEL - Abnormal; Notable for the following components: Acetaminophen Level <5 (*)     All other components within normal limits    Narrative:     Performed at:  [address] [facility] [employer]   Phone [phone]   SALICYLATE LEVEL - Abnormal; Notable for the following components:    Salicylate, Serum <0.0 (*)     All other components within normal limits    Narrative:     Performed at:  [address] [facility] [employer]   Phone [phone]   LACTIC ACID, PLASMA - Abnormal; Notable for the following components:    Lactic Acid 2.3 (*)     All other components within normal limits    Narrative:     Performed at:  [address] [employer]   Phone [phone]   AMMONIA - Abnormal; Notable for the following components:    Ammonia 13 (*)     All other components within normal limits    Narrative:     Performed at:  [address] [facility] [place] [address]   Phone [phone]   BLOOD GAS, ARTERIAL - Abnormal; Notable for the following components:    Base Excess, Arterial 3.4 (*)     Hemoglobin, Art, Extended 11.4 (*)     All other components within normal limits    Narrative:     Performed at:  [address] [place] [address]   Phone [phone]   MICROSCOPIC URINALYSIS - Abnormal; Notable for the following components:    Hyaline Casts, UA 19 (*)     Epithelial Cells, UA 9 (*)     All other components within normal limits    Narrative:     Performed at:  [address] [place] [address]   Phone [phone]   POCT GLUCOSE - Abnormal; Notable for the following components:    POC Glucose 101 (*)     All other components within normal limits    Narrative:     Performed at:  [address] [place] [address] [facility] [place] [address]   Phone [phone] RAPID    Narrative:     Performed at:  [facility]  [address] [facility] [place] [address]   Phone [phone]   CULTURE, BLOOD 1   CULTURE, BLOOD 2   TROPONIN    Narrative:     Performed at:  [facility]  [address] [place] [address]   Phone [phone]   ETHANOL    Narrative:     Performed at:  [facility]  [address] [facility], [place] [address]   Phone [phone]   PROCALCITONIN    Narrative:     Performed at:  [facility]  [address] [facility], [place] [address]   Phone [phone]   PROLACTIN    Narrative:     Performed at:  [facility]  [address] [facility], [place] [address]   Phone [phone]       All other labs were within normal range or not returned as of this dictation. EKG: All EKG's are interpreted by the Emergency Department Physician in the absence of a cardiologist.  Please see their note for interpretation of EKG. RADIOLOGY:   Non-plain film images such as CT, Ultrasound and MRI are read by the radiologist. Plain radiographic images are visualized and preliminarily interpreted by the ED Provider with the below findings:        Interpretation per the Radiologist below, if available at the time of this note:    CT Head WO Contrast   Final Result   1. No acute intracranial abnormality. XR CHEST PORTABLE   Final Result   Faint bilateral interstitial opacities favored to be vascular congestion and   possibly mild superimposed interstitial edema although infection or   aspiration could potentially have chest x-ray appearance.            CT Head WO Contrast    Result Date: 6/25/2021  EXAMINATION: CT OF THE HEAD WITHOUT CONTRAST  6/25/2021 12:53 pm TECHNIQUE: CT of the head was performed without the administration of intravenous 06/25/21 1212 06/25/21 1307 06/25/21 1522   BP: (!) 173/90     Pulse: 66 92 91   Resp: 19 24 20   Temp: 98 °F (36.7 °C)     SpO2: 99% 100%    Weight: (!) 377 lb 10.4 oz (171.3 kg)         Patient was given the following medications:  Medications   propofol injection (40 mcg/kg/min × 171.3 kg Intravenous Rate/Dose Change 6/25/21 1524)   naloxone (NARCAN) injection 2 mg (2 mg Intravenous Given 6/25/21 1235)   naloxone (NARCAN) injection 2 mg (2 mg Intravenous Given 6/25/21 1253)   etomidate (AMIDATE) injection (30 mg Intravenous Given 6/25/21 1258)   rocuronium (ZEMURON) injection (100 mg Intravenous Given 6/25/21 1259)   naloxone (NARCAN) injection (4 mg Intravenous Given 6/25/21 1255)   ampicillin-sulbactam (UNASYN) 3000 mg ivpb minibag (0 mg Intravenous Stopped 6/25/21 1521)           ED COURSE & MEDICAL DECISION MAKING    - The patient presented to the ER with complaints of OD, AMS. Vital signs were reviewed. Exam with WDWN male who is unresponsive. Peripheral IV placed. Labs, Imaging ordered. - Pertinent Labs & Imaging studies reviewed. (See chart for details)   -  Patient seen and evaluated in the emergency department. -  Triage and nursing notes reviewed and incorporated. -  Old chart records reviewed and incorporated. -   I have seen and evaluated this patient with my supervising physician Heidi King MD.  -  Differential diagnosis includes: stroke, TIA, intracranial bleed, trauma, infection/sepsis, medication side effect, intoxication/withdrawal, metabolic/electrolyte abnormalities  -  Work-up included:  See above  -  ED treatment included:  Intubation (performed by Eve Verdugo, please see her note for details)  - Consults: Dr Becky Edwards spoke with hospitalist for admission orders  -  Results discussed with patient and/or family. Labs show Metabolic panelno leukocytosis or concerning anemia, with no concerning abnormalities. LFTs with ALT 45. Troponin is <0.01.  Lactic acid is mildly elevated at are mis-transcribed.)    VANDANA Kerns (electronically signed)            Verito Maguire, [address]  06/25/21 8257

## 2021-06-25 NOTE — CONSULTS
REASON FOR CONSULTATION/CC: Accidental drug overdose      Consult at request of Yanira Reynaga MD     PCP: No primary care provider on file. Established Pulmonologist:  None    Chief Complaint   Patient presents with    Drug Overdose     unknown substance, has HX of heroin use, now uses suboxone. family gave pt 12mg narcan. Pt still remains altered. HISTORY OF PRESENT ILLNESS: Berlin Velasquez is a 51y.o. year old male with a history of morbid obesity, polysubstance abuse who presents with altered mental status with reported somnolence at home. Was given multiple doses of Narcan by bystanders as well as EMS. Patient has a known history of opiate abuse. On arrival to ED UDS positive for methadone, amphetamines, benzodiazepines and PCP. Patient is currently intubated and sedated unable provide HPI this obtained from bedside nurse report and EMR. Electrolytes within normal limits renal function okay. Chest x-ray without focal consolidation and CT head unremarkable. No past medical history on file. No past surgical history on file. Family Hx  family history is not on file. Unable to obtain due to mechanical ventilation  Social Hx   has no history on file for tobacco use. Scheduled Meds:      Continuous Infusions:   propofol 40 mcg/kg/min (06/25/21 1524)       PRN Meds:      ALLERGIES:  Patient has No Known Allergies. REVIEW OF SYSTEMS:  Unable to obtain due to mechanical ventilation  Objective:   PHYSICAL EXAM:  Blood pressure (!) 173/90, pulse 91, temperature 98 °F (36.7 °C), resp. rate 20, weight (!) 377 lb 10.4 oz (171.3 kg), SpO2 100 %.'  Gen:  No acute distress. Eyes: PERRL. Anicteric sclera. No conjunctival injection. ENT: No discharge. OP with ETT. External appearance of ears and nose normal.  Neck: Trachea midline. No mass   Resp:  No crackles. No wheezes. No rhonchi. No dullness on percussion. CV: Regular rate. Regular rhythm. No murmur or rub. No edema.    GI: pm    COMPARISON:  None. HISTORY:  ORDERING SYSTEM PROVIDED HISTORY: OD  TECHNOLOGIST PROVIDED HISTORY:  Reason for exam:->OD  Reason for Exam: OD    FINDINGS:  Low lung volume portable chest x-ray in expiratory phase. Faint bilateral  pulmonary opacities are present. No focal consolidation. No pneumothorax or  pleural effusion or acute findings of the cardiac or mediastinal structures    Impression  Faint bilateral interstitial opacities favored to be vascular congestion and  possibly mild superimposed interstitial edema although infection or  aspiration could potentially have chest x-ray appearance. Access  Arterial       PICC           CVC               Assessment/Plan:     Acute hypoxemic respiratory failure SPO2 less than 90% on room air  -Full vent support, ABG on vent looks appropriate  -Continue to wean supplemental oxygen goal SPO2 90%    Acute toxic encephalopathy, due to  Unintentional drug overdose  -UDS with amphetamines, benzodiazepines, methadone and PCP  -Monitor vital signs, mostly supportive/symptomatic care    Polysubstance abuse    Morbid obesity    Recommend initiating:  -Peridex  Pepcid  Heparin    Due to the immediate potential for life-threatening deterioration due to respiratory failure and drug overdose, I spent 33 minutes providing critical care. This time is excluding time spent performing separately billable procedures. This note was transcribed using [id number] [employer]. Please disregard any translational errors.     Thank you for the consult    [address]  Pulmonary, Sleep and Critical Care Medicine

## 2021-06-26 LAB
ANION GAP SERPL CALCULATED.3IONS-SCNC: 8 MMOL/L (ref 3–16)
BUN BLDV-MCNC: 8 MG/DL (ref 7–20)
CALCIUM SERPL-MCNC: 8.5 MG/DL (ref 8.3–10.6)
CHLORIDE BLD-SCNC: 105 MMOL/L (ref 99–110)
CO2: 29 MMOL/L (ref 21–32)
CREAT SERPL-MCNC: 0.7 MG/DL (ref 0.9–1.3)
GFR AFRICAN AMERICAN: >60
GFR NON-AFRICAN AMERICAN: >60
GLUCOSE BLD-MCNC: 102 MG/DL (ref 70–99)
GLUCOSE BLD-MCNC: 137 MG/DL (ref 70–99)
GLUCOSE BLD-MCNC: 138 MG/DL (ref 70–99)
GLUCOSE BLD-MCNC: 37 MG/DL (ref 70–99)
GLUCOSE BLD-MCNC: 51 MG/DL (ref 70–99)
GLUCOSE BLD-MCNC: 53 MG/DL (ref 70–99)
GLUCOSE BLD-MCNC: 62 MG/DL (ref 70–99)
GLUCOSE BLD-MCNC: 65 MG/DL (ref 70–99)
GLUCOSE BLD-MCNC: 72 MG/DL (ref 70–99)
GLUCOSE BLD-MCNC: 76 MG/DL (ref 70–99)
GLUCOSE BLD-MCNC: 77 MG/DL (ref 70–99)
GLUCOSE BLD-MCNC: 85 MG/DL (ref 70–99)
GLUCOSE BLD-MCNC: 94 MG/DL (ref 70–99)
GLUCOSE BLD-MCNC: 97 MG/DL (ref 70–99)
GLUCOSE BLD-MCNC: 98 MG/DL (ref 70–99)
LACTIC ACID: 0.8 MMOL/L (ref 0.4–2)
LACTIC ACID: 1.1 MMOL/L (ref 0.4–2)
LACTIC ACID: 1.7 MMOL/L (ref 0.4–2)
LV EF: 63 %
LVEF MODALITY: NORMAL
MAGNESIUM: 2 MG/DL (ref 1.8–2.4)
PERFORMED ON: ABNORMAL
PERFORMED ON: NORMAL
PHOSPHORUS: 3 MG/DL (ref 2.5–4.9)
POTASSIUM SERPL-SCNC: 3.6 MMOL/L (ref 3.5–5.1)
PRO-BNP: 89 PG/ML (ref 0–124)
SODIUM BLD-SCNC: 142 MMOL/L (ref 136–145)

## 2021-06-26 PROCEDURE — 6360000002 HC RX W HCPCS: Performed by: NURSE PRACTITIONER

## 2021-06-26 PROCEDURE — 2500000003 HC RX 250 WO HCPCS

## 2021-06-26 PROCEDURE — 94003 VENT MGMT INPAT SUBQ DAY: CPT

## 2021-06-26 PROCEDURE — 36569 INSJ PICC 5 YR+ W/O IMAGING: CPT

## 2021-06-26 PROCEDURE — 36415 COLL VENOUS BLD VENIPUNCTURE: CPT

## 2021-06-26 PROCEDURE — 83735 ASSAY OF MAGNESIUM: CPT

## 2021-06-26 PROCEDURE — 85025 COMPLETE CBC W/AUTO DIFF WBC: CPT

## 2021-06-26 PROCEDURE — 80048 BASIC METABOLIC PNL TOTAL CA: CPT

## 2021-06-26 PROCEDURE — C1751 CATH, INF, PER/CENT/MIDLINE: HCPCS

## 2021-06-26 PROCEDURE — 6360000002 HC RX W HCPCS: Performed by: INTERNAL MEDICINE

## 2021-06-26 PROCEDURE — 2500000003 HC RX 250 WO HCPCS: Performed by: NURSE PRACTITIONER

## 2021-06-26 PROCEDURE — 2580000003 HC RX 258: Performed by: NURSE PRACTITIONER

## 2021-06-26 PROCEDURE — 83605 ASSAY OF LACTIC ACID: CPT

## 2021-06-26 PROCEDURE — 6360000004 HC RX CONTRAST MEDICATION: Performed by: INTERNAL MEDICINE

## 2021-06-26 PROCEDURE — 94761 N-INVAS EAR/PLS OXIMETRY MLT: CPT

## 2021-06-26 PROCEDURE — 2700000000 HC OXYGEN THERAPY PER DAY

## 2021-06-26 PROCEDURE — 2000000000 HC ICU R&B

## 2021-06-26 PROCEDURE — C8929 TTE W OR WO FOL WCON,DOPPLER: HCPCS

## 2021-06-26 PROCEDURE — 84100 ASSAY OF PHOSPHORUS: CPT

## 2021-06-26 PROCEDURE — 99291 CRITICAL CARE FIRST HOUR: CPT | Performed by: INTERNAL MEDICINE

## 2021-06-26 PROCEDURE — 83880 ASSAY OF NATRIURETIC PEPTIDE: CPT

## 2021-06-26 PROCEDURE — 2500000003 HC RX 250 WO HCPCS: Performed by: INTERNAL MEDICINE

## 2021-06-26 PROCEDURE — 76937 US GUIDE VASCULAR ACCESS: CPT

## 2021-06-26 PROCEDURE — 6360000002 HC RX W HCPCS: Performed by: STUDENT IN AN ORGANIZED HEALTH CARE EDUCATION/TRAINING PROGRAM

## 2021-06-26 PROCEDURE — 2580000003 HC RX 258: Performed by: INTERNAL MEDICINE

## 2021-06-26 PROCEDURE — 6370000000 HC RX 637 (ALT 250 FOR IP): Performed by: INTERNAL MEDICINE

## 2021-06-26 PROCEDURE — APPNB15 APP NON BILLABLE TIME 0-15 MINS: Performed by: NURSE PRACTITIONER

## 2021-06-26 RX ORDER — MIDAZOLAM HYDROCHLORIDE 1 MG/ML
2 INJECTION INTRAMUSCULAR; INTRAVENOUS
Status: DISCONTINUED | OUTPATIENT
Start: 2021-06-26 | End: 2021-06-27

## 2021-06-26 RX ORDER — DEXTROSE MONOHYDRATE 25 G/50ML
INJECTION, SOLUTION INTRAVENOUS
Status: COMPLETED
Start: 2021-06-26 | End: 2021-06-26

## 2021-06-26 RX ORDER — FENTANYL CITRATE 50 UG/ML
25 INJECTION, SOLUTION INTRAMUSCULAR; INTRAVENOUS
Status: DISCONTINUED | OUTPATIENT
Start: 2021-06-26 | End: 2021-06-27

## 2021-06-26 RX ORDER — NICOTINE POLACRILEX 4 MG
15 LOZENGE BUCCAL PRN
Status: DISCONTINUED | OUTPATIENT
Start: 2021-06-26 | End: 2021-06-28 | Stop reason: HOSPADM

## 2021-06-26 RX ORDER — METHADONE HYDROCHLORIDE 10 MG/1
80 TABLET ORAL DAILY
Status: DISCONTINUED | OUTPATIENT
Start: 2021-06-26 | End: 2021-06-28 | Stop reason: HOSPADM

## 2021-06-26 RX ORDER — DEXTROSE MONOHYDRATE 25 G/50ML
12.5 INJECTION, SOLUTION INTRAVENOUS PRN
Status: DISCONTINUED | OUTPATIENT
Start: 2021-06-26 | End: 2021-06-28 | Stop reason: HOSPADM

## 2021-06-26 RX ORDER — CHLORHEXIDINE GLUCONATE 0.12 MG/ML
15 RINSE ORAL 2 TIMES DAILY
Status: DISCONTINUED | OUTPATIENT
Start: 2021-06-26 | End: 2021-06-27

## 2021-06-26 RX ORDER — DEXMEDETOMIDINE HYDROCHLORIDE 4 UG/ML
.2-1.4 INJECTION, SOLUTION INTRAVENOUS CONTINUOUS
Status: DISCONTINUED | OUTPATIENT
Start: 2021-06-26 | End: 2021-06-26

## 2021-06-26 RX ORDER — DEXTROSE MONOHYDRATE 50 MG/ML
100 INJECTION, SOLUTION INTRAVENOUS PRN
Status: DISCONTINUED | OUTPATIENT
Start: 2021-06-26 | End: 2021-06-28 | Stop reason: HOSPADM

## 2021-06-26 RX ADMIN — MIDAZOLAM 2 MG: 1 INJECTION INTRAMUSCULAR; INTRAVENOUS at 22:11

## 2021-06-26 RX ADMIN — DEXTROSE MONOHYDRATE 12.5 G: 25 INJECTION, SOLUTION INTRAVENOUS at 07:40

## 2021-06-26 RX ADMIN — Medication 10 ML: at 20:15

## 2021-06-26 RX ADMIN — FENTANYL CITRATE 25 MCG: 50 INJECTION, SOLUTION INTRAMUSCULAR; INTRAVENOUS at 06:38

## 2021-06-26 RX ADMIN — DEXTROSE MONOHYDRATE 12.5 G: 25 INJECTION, SOLUTION INTRAVENOUS at 04:26

## 2021-06-26 RX ADMIN — DEXTROSE MONOHYDRATE 100 ML/HR: 50 INJECTION, SOLUTION INTRAVENOUS at 17:47

## 2021-06-26 RX ADMIN — Medication 75 MCG/HR: at 07:05

## 2021-06-26 RX ADMIN — DEXMEDETOMIDINE 0.2 MCG/KG/HR: 100 INJECTION, SOLUTION, CONCENTRATE INTRAVENOUS at 10:16

## 2021-06-26 RX ADMIN — ENOXAPARIN SODIUM 40 MG: 40 INJECTION SUBCUTANEOUS at 09:03

## 2021-06-26 RX ADMIN — DEXMEDETOMIDINE 0.7 MCG/KG/HR: 100 INJECTION, SOLUTION, CONCENTRATE INTRAVENOUS at 19:37

## 2021-06-26 RX ADMIN — MIDAZOLAM 2 MG: 1 INJECTION INTRAMUSCULAR; INTRAVENOUS at 07:07

## 2021-06-26 RX ADMIN — PROPOFOL 50 MCG/KG/MIN: 10 INJECTION, EMULSION INTRAVENOUS at 08:01

## 2021-06-26 RX ADMIN — PROPOFOL 50 MCG/KG/MIN: 10 INJECTION, EMULSION INTRAVENOUS at 00:34

## 2021-06-26 RX ADMIN — MIDAZOLAM 2 MG: 1 INJECTION INTRAMUSCULAR; INTRAVENOUS at 17:30

## 2021-06-26 RX ADMIN — DEXTROSE MONOHYDRATE 100 ML/HR: 50 INJECTION, SOLUTION INTRAVENOUS at 07:47

## 2021-06-26 RX ADMIN — PROPOFOL 40 MCG/KG/MIN: 10 INJECTION, EMULSION INTRAVENOUS at 11:34

## 2021-06-26 RX ADMIN — MIDAZOLAM 2 MG: 1 INJECTION INTRAMUSCULAR; INTRAVENOUS at 20:08

## 2021-06-26 RX ADMIN — DEXMEDETOMIDINE 0.8 MCG/KG/HR: 100 INJECTION, SOLUTION, CONCENTRATE INTRAVENOUS at 22:44

## 2021-06-26 RX ADMIN — PROPOFOL 15 MCG/KG/MIN: 10 INJECTION, EMULSION INTRAVENOUS at 21:54

## 2021-06-26 RX ADMIN — DEXMEDETOMIDINE 0.6 MCG/KG/HR: 100 INJECTION, SOLUTION, CONCENTRATE INTRAVENOUS at 16:00

## 2021-06-26 RX ADMIN — MIDAZOLAM 2 MG: 1 INJECTION INTRAMUSCULAR; INTRAVENOUS at 14:32

## 2021-06-26 RX ADMIN — PROPOFOL 50 MCG/KG/MIN: 10 INJECTION, EMULSION INTRAVENOUS at 04:28

## 2021-06-26 RX ADMIN — MIDAZOLAM 2 MG: 1 INJECTION INTRAMUSCULAR; INTRAVENOUS at 19:01

## 2021-06-26 RX ADMIN — PROPOFOL 50 MCG/KG/MIN: 10 INJECTION, EMULSION INTRAVENOUS at 09:47

## 2021-06-26 RX ADMIN — CHLORHEXIDINE GLUCONATE 15 ML: 1.2 RINSE ORAL at 07:42

## 2021-06-26 RX ADMIN — Medication 5 ML: at 07:42

## 2021-06-26 RX ADMIN — MIDAZOLAM 2 MG: 1 INJECTION INTRAMUSCULAR; INTRAVENOUS at 12:18

## 2021-06-26 RX ADMIN — FAMOTIDINE 20 MG: 10 INJECTION, SOLUTION INTRAVENOUS at 20:08

## 2021-06-26 RX ADMIN — PROPOFOL 50 MCG/KG/MIN: 10 INJECTION, EMULSION INTRAVENOUS at 06:26

## 2021-06-26 RX ADMIN — Medication 10 ML: at 07:41

## 2021-06-26 RX ADMIN — ENOXAPARIN SODIUM 40 MG: 40 INJECTION SUBCUTANEOUS at 20:09

## 2021-06-26 RX ADMIN — DEXTROSE MONOHYDRATE 12.5 G: 25 INJECTION, SOLUTION INTRAVENOUS at 12:10

## 2021-06-26 RX ADMIN — Medication 50 MCG/HR: at 04:45

## 2021-06-26 RX ADMIN — CHLORHEXIDINE GLUCONATE 15 ML: 1.2 RINSE ORAL at 20:15

## 2021-06-26 RX ADMIN — FAMOTIDINE 20 MG: 10 INJECTION, SOLUTION INTRAVENOUS at 07:40

## 2021-06-26 RX ADMIN — PERFLUTREN 1.65 MG: 6.52 INJECTION, SUSPENSION INTRAVENOUS at 13:52

## 2021-06-26 RX ADMIN — METHADONE HYDROCHLORIDE 80 MG: 10 TABLET ORAL at 09:02

## 2021-06-26 RX ADMIN — PROPOFOL 25 MCG/KG/MIN: 10 INJECTION, EMULSION INTRAVENOUS at 17:44

## 2021-06-26 RX ADMIN — MIDAZOLAM 2 MG: 1 INJECTION INTRAMUSCULAR; INTRAVENOUS at 08:20

## 2021-06-26 ASSESSMENT — PULMONARY FUNCTION TESTS
PIF_VALUE: 17
PIF_VALUE: 19
PIF_VALUE: 16
PIF_VALUE: 15
PIF_VALUE: 19
PIF_VALUE: 23
PIF_VALUE: 23
PIF_VALUE: 17
PIF_VALUE: 18
PIF_VALUE: 21
PIF_VALUE: 23
PIF_VALUE: 24
PIF_VALUE: 23
PIF_VALUE: 20
PIF_VALUE: 22
PIF_VALUE: 24
PIF_VALUE: 22
PIF_VALUE: 25
PIF_VALUE: 18
PIF_VALUE: 25
PIF_VALUE: 27
PIF_VALUE: 19
PIF_VALUE: 20
PIF_VALUE: 31
PIF_VALUE: 20
PIF_VALUE: 21
PIF_VALUE: 19
PIF_VALUE: 22
PIF_VALUE: 20
PIF_VALUE: 6
PIF_VALUE: 20
PIF_VALUE: 23
PIF_VALUE: 22
PIF_VALUE: 21

## 2021-06-26 ASSESSMENT — PAIN SCALES - GENERAL
PAINLEVEL_OUTOF10: 0

## 2021-06-26 NOTE — PROGRESS NOTES
PICC PLACEMENT:    Upon arrival to place PICC line assessed chart for issues related to picc placement, check for consent, and did time out with RN Shane Push. Pt. Tolerated PICC placement well, no difficulty  Accessing right basilic vein and 3CG technology used to verify PICC tip placement. Positive P wave with no negative deflection. Printed wave form and placed In chart. Reported off to RN Shane Push ok to use line.

## 2021-06-26 NOTE — PROGRESS NOTES
1900-Shift report from [facility], [address] [place] [address]. Pt intubated and sedated at this time. 0445- Pt became very agitated after oral care was performed. Pt began thrashing head and kicking legs violently. RN attempted to calm down pt multiple times. Pt continued to thrash head and legs and try to pull at ETT. Continuous fentanyl sedation was started. 0057- Pt once again became very agitated and started to thrash head and legs, trying to sit up and pull at ETT. Dr. Garcia Remedies for possible prn sedation orders. See new orders. 0700- Shift Handoff with Danny Quinones RN.

## 2021-06-26 NOTE — PLAN OF CARE
Problem: Nutrition  Goal: Optimal nutrition therapy  Outcome: Ongoing     Nutrition Problem #1: Inadequate oral intake  Intervention: Food and/or Nutrient Delivery: Start Tube Feeding  Nutritional Goals:  Tolerate EN at goal

## 2021-06-26 NOTE — CONSULTS
Comprehensive Nutrition Assessment    Type and Reason for Visit:  Initial, Consult    Nutrition Recommendations/Plan:   Recommend Vital HP at 10 ml/hr (calculated over 20 hr to account for routine nsg care) + 5 Proteinex daily. Total kcals (with propofol) 2077 kcal, protein total 148 gm. Nutrition Assessment:  Consult for tube feed ordering and management. Pt new vent. Admitted with drug overdose. Also with bilateral lower extremity edema concerning for heart failure. Currently intubated and sedated, precedex, fentanyl, and propofol ordered for sedation. Propofol at 51.4 ml/hr providing 1357 kcal from lipids x 24 hr. . Will order EN. Malnutrition Assessment:  Malnutrition Status:  Insufficient data      Estimated Daily Nutrient Needs:  Energy (kcal):  1300-1542 kcal (11-14 kcal/kg ABW); Weight Used for Energy Requirements:  Current     Protein (g):  150-188 gm (2-2.5 gm/kg IBW); Weight Used for Protein Requirements:  Ideal        Fluid (ml/day):  per MD; Method Used for Fluid Requirements:  Other (Comment)      Nutrition Related Findings:  generalized edema, +1 pitting to BLE; reviewed labs      Wounds:  None       Current Nutrition Therapies:    Diet NPO  Current Tube Feeding (TF) Orders:  · Goal TF & Flush Orders Provides: Recommend Vital HP at 10 ml/hr (calculated over 20 hr to account for routine nsg care) + 5 Proteinex daily. This provides 200 ml TV, 200 kcal, 18 gm of protein, and 167 ml free fluid. Protienex 5x per day provides an additional 520 kcal, 130 gm of protein. Total kcals (with propofol) 2077 kcal, protein total 148 gm.       Anthropometric Measures:  · Height: 5' 10\" (177.8 cm) (per care everywhere)  · Current Body Weight: 375 lb (170.1 kg)   · Admission Body Weight: 377 lb (171 kg)    · Ideal Body Weight: 166 lbs; % Ideal Body Weight 225.9 %   · BMI: 53.8  · Adjusted Body Weight:  ; No Adjustment   · BMI Categories: Obese Class 3 (BMI 40.0 or greater)       Nutrition Diagnosis:   · Inadequate oral intake related to impaired respiratory function as evidenced by NPO or clear liquid status due to medical condition, intubation      Nutrition Interventions:   Food and/or Nutrient Delivery:  Start Tube Feeding  Nutrition Education/Counseling:  No recommendation at this time   Coordination of Nutrition Care:  Continue to monitor while inpatient    Goals: Tolerate EN at goal       Nutrition Monitoring and Evaluation:   Behavioral-Environmental Outcomes:  None Identified   Food/Nutrient Intake Outcomes:  Enteral Nutrition Intake/Tolerance  Physical Signs/Symptoms Outcomes:  Biochemical Data, Hemodynamic Status, GI Status, Nutrition Focused Physical Findings, Skin, Weight     Discharge Planning:     Too soon to determine     Electronically signed by Suzanne Alan RD, LD on 6/26/21 at 10:54 AM EDT    Contact: [phone]

## 2021-06-26 NOTE — PROGRESS NOTES
Mother Elias Vences and Father Amanda oJe at bedside. Update given. Emergency contacts updated. Pt lives with mother/father and adult daughter child Rosalie Rodriguez.

## 2021-06-26 NOTE — PROGRESS NOTES
Pulmonary Progress Note    Date of Admission: 6/25/2021   LOS: 1 day     CC:  Chief Complaint   Patient presents with    Drug Overdose     unknown substance, has HX of heroin use, now uses suboxone. family gave pt 12mg narcan. Pt still remains altered. Assessment/Plan     Acute hypoxemic respiratory failure SPO2 less than 90% on room air  -Full vent support, wean supplemental oxygen goal SPO2 90%  -Chest x-ray with low lung volumes otherwise no focal process  -Plan for SAT, SBT     Acute toxic encephalopathy, due to  Unintentional drug overdose  -UDS with amphetamines, benzodiazepines, methadone and PCP  -Continue supportive care     Polysubstance abuse     Morbid obesity     -Peridex  Pepcid  Lovenox     Due to the immediate potential for life-threatening deterioration due to respiratory failure and drug overdose, I spent 32 minutes providing critical care. This time is excluding time spent performing separately billable procedures.       HPI/Subjective  No acute events overnight. Patient intermittently agitated     ROS: Unable to obtain due to mechanical ventilation      Intake/Output Summary (Last 24 hours) at 6/26/2021 0804  Last data filed at 6/26/2021 0742  Gross per 24 hour   Intake 370.8 ml   Output 2000 ml   Net -1629.2 ml         PHYSICAL EXAM:   Blood pressure (!) 149/93, pulse 74, temperature 98.5 °F (36.9 °C), temperature source Axillary, resp. rate 16, weight (!) 375 lb 10.6 oz (170.4 kg), SpO2 100 %. Gen:  No acute distress. Eyes: PERRL. Anicteric sclera. No conjunctival injection. ENT: No discharge. OP with ETT. External appearance of ears and nose normal.  Neck: Trachea midline. No mass   Resp:  No crackles. No wheezes. No rhonchi. No dullness on percussion. CV: Regular rate. Regular rhythm. No murmur or rub. No edema. GI: Soft, Non-tender. Non-distended. +BS  Skin: Warm, dry, w/o erythema. Lymph: No cervical or supraclavicular LAD. M/S: No cyanosis. No clubbing. Neuro:  no focal neurologic deficit. Intubated and sedated      Medications:    Scheduled Meds:   chlorhexidine  15 mL Mouth/Throat BID    famotidine (PEPCID) injection  20 mg Intravenous BID    sodium chloride flush  5-40 mL Intravenous 2 times per day    enoxaparin  40 mg Subcutaneous Nightly    lidocaine 1 % injection  5 mL Intradermal Once    sodium chloride flush  5-40 mL Intravenous 2 times per day       Continuous Infusions:   dextrose 100 mL/hr (06/26/21 0747)    fentaNYL 75 mcg/hr (06/26/21 0705)    propofol 50 mcg/kg/min (06/26/21 0801)    sodium chloride      sodium chloride         PRN Meds:  glucose, dextrose, glucagon (rDNA), dextrose, fentanNYL, midazolam, sodium chloride flush, sodium chloride, ondansetron **OR** ondansetron, polyethylene glycol, acetaminophen **OR** acetaminophen, sodium chloride flush, sodium chloride, perflutren lipid microspheres    Labs reviewed:  CBC:   Recent Labs     06/25/21  1244 06/26/21  0438   WBC 9.7 8.7   HGB 11.0* 10.9*   HCT 35.9* 35.1*   MCV 57.7* 57.5*    283     BMP:   Recent Labs     06/25/21  1244 06/26/21  0438    142   K 3.8 3.6    105   CO2 25 29   PHOS  --  3.0   BUN 13 8   CREATININE 0.9 0.7*     LIVER PROFILE:   Recent Labs     06/25/21  1244   AST 28   ALT 45*   BILIDIR <0.2   BILITOT 0.4   ALKPHOS 90     PT/INR: No results for input(s): PROTIME, INR in the last 72 hours. APTT: No results for input(s): APTT in the last 72 hours. UA:  Recent Labs     06/25/21  1312   COLORU DK YELLOW   PHUR 6.5  6.5   WBCUA 4   RBCUA 1   CLARITYU Clear   SPECGRAV 1.027   LEUKOCYTESUR Negative   UROBILINOGEN 1.0   BILIRUBINUR Negative   BLOODU Negative   GLUCOSEU Negative     No results for input(s): PH, PCO2, PO2 in the last 72 hours. Films:  Radiology Review:  Pertinent images / reports were reviewed as a part of this visit.        XR CHEST PORTABLE  Narrative: EXAMINATION:  ONE XRAY VIEW OF THE CHEST    6/25/2021 4:20 pm    COMPARISON:  Chest x-ray dated 06/25/2021 at 1:18 p.m. HISTORY:  ORDERING SYSTEM PROVIDED HISTORY: post intubation  TECHNOLOGIST PROVIDED HISTORY:  Reason for exam:->post intubation  Reason for Exam: post ETT/OG  Acuity: Acute  Type of Exam: Initial    FINDINGS:  LINES/TUBES/OTHER: Endotracheal tube is noted with its tip approximately 2.2  cm from the kevin in satisfactory position. Enteric tube is noted coursing  below the level of the diaphragm with its tip and side port within the  stomach. HEART/MEDIASTINUM: The cardiomediastinal silhouette is stable. PLEURA/LUNGS: There are low lung volumes. There is elevation of the right  hemidiaphragm. There are increased interstitial markings which may be due to  vascular crowding from low lung volumes versus pulmonary vascular congestion. Atypical pneumonia cannot be excluded. There are no focal consolidations or  pleural effusions. There is no appreciable pneumothorax. BONES/SOFT TISSUE: No acute abnormality. Impression: Endotracheal tube and enteric tube in satisfactory position. No change in increased interstitial markings which may be due to vascular  crowding from low lung volumes versus pulmonary vascular congestion. Atypical pneumonia cannot be excluded. CT Head WO Contrast  Narrative: EXAMINATION:  CT OF THE HEAD WITHOUT CONTRAST  6/25/2021 12:53 pm    TECHNIQUE:  CT of the head was performed without the administration of intravenous  contrast. Dose modulation, iterative reconstruction, and/or weight based  adjustment of the mA/kV was utilized to reduce the radiation dose to as low  as reasonably achievable. COMPARISON:  None. HISTORY:  ORDERING SYSTEM PROVIDED HISTORY: AMS  TECHNOLOGIST PROVIDED HISTORY:  Reason for exam:->AMS  Has a \"code stroke\" or \"stroke alert\" been called? ->No  Decision Support Exception - unselect if not a suspected or confirmed  emergency medical condition->Emergency Medical Condition (MA)    FINDINGS:  BRAIN/VENTRICLES: There is no acute intracerebral hemorrhage or extra-axial  fluid collection. The ventricles and sulci are within normal limits. ORBITS: The orbits are unremarkable. There is a dysconjugate gaze. SINUSES: Mild mucosal hypertrophy involves the bilateral maxillary sinuses  and ethmoid air cells. Opacification involves the left nasal cavity. There  is partial opacification of the left mastoid air cells. SOFT TISSUES/SKULL:  The calvarium is intact. Impression: 1. No acute intracranial abnormality. XR CHEST PORTABLE  Narrative: EXAMINATION:  ONE XRAY VIEW OF THE CHEST    6/25/2021 12:37 pm    COMPARISON:  None. HISTORY:  ORDERING SYSTEM PROVIDED HISTORY: OD  TECHNOLOGIST PROVIDED HISTORY:  Reason for exam:->OD  Reason for Exam: OD    FINDINGS:  Low lung volume portable chest x-ray in expiratory phase. Faint bilateral  pulmonary opacities are present. No focal consolidation. No pneumothorax or  pleural effusion or acute findings of the cardiac or mediastinal structures  Impression: Faint bilateral interstitial opacities favored to be vascular congestion and  possibly mild superimposed interstitial edema although infection or  aspiration could potentially have chest x-ray appearance. Access  Arterial       PICC       PICC Double Lumen 02/96/44 Right Basilic (Active)   Is this a power PICC? Yes 06/26/21 0600   Site Assessment Clean;Dry; Intact 06/26/21 0600   Red Lumen Status Infusing;Capped 06/26/21 0600   Purple Lumen Status Infusing;Capped 06/26/21 0600   Exposed Catheter (cm) 0 cm 06/26/21 0600   Extremity Circumference (cm) 42 cm 06/26/21 0600   Line Care Connections checked and tightened 06/26/21 0600   Alcohol Cap Used Yes 06/26/21 0600   Dressing Status Clean;Dry; Intact 06/26/21 0600   Dressing Type Transparent; Anti-microbial patch; Securing device 06/26/21 0600   Dressing Change Due 06/30/21 06/26/21 0600   Dressing Intervention New 06/26/21 0026

## 2021-06-27 PROBLEM — E66.9 SUPER OBESITY: Status: ACTIVE | Noted: 2021-06-27

## 2021-06-27 PROBLEM — G93.41 ACUTE METABOLIC ENCEPHALOPATHY: Status: ACTIVE | Noted: 2021-06-27

## 2021-06-27 LAB
ANION GAP SERPL CALCULATED.3IONS-SCNC: 6 MMOL/L (ref 3–16)
BASOPHILS ABSOLUTE: 0 K/UL (ref 0–0.2)
BASOPHILS RELATIVE PERCENT: 0.3 %
BUN BLDV-MCNC: 10 MG/DL (ref 7–20)
CALCIUM SERPL-MCNC: 8.3 MG/DL (ref 8.3–10.6)
CHLORIDE BLD-SCNC: 102 MMOL/L (ref 99–110)
CO2: 29 MMOL/L (ref 21–32)
CREAT SERPL-MCNC: 0.8 MG/DL (ref 0.9–1.3)
EOSINOPHILS ABSOLUTE: 0.4 K/UL (ref 0–0.6)
EOSINOPHILS RELATIVE PERCENT: 4.2 %
GFR AFRICAN AMERICAN: >60
GFR NON-AFRICAN AMERICAN: >60
GLUCOSE BLD-MCNC: 106 MG/DL (ref 70–99)
GLUCOSE BLD-MCNC: 110 MG/DL (ref 70–99)
GLUCOSE BLD-MCNC: 148 MG/DL (ref 70–99)
GLUCOSE BLD-MCNC: 159 MG/DL (ref 70–99)
GLUCOSE BLD-MCNC: 44 MG/DL (ref 70–99)
GLUCOSE BLD-MCNC: 57 MG/DL (ref 70–99)
GLUCOSE BLD-MCNC: 63 MG/DL (ref 70–99)
GLUCOSE BLD-MCNC: 64 MG/DL (ref 70–99)
GLUCOSE BLD-MCNC: 65 MG/DL (ref 70–99)
GLUCOSE BLD-MCNC: 70 MG/DL (ref 70–99)
GLUCOSE BLD-MCNC: 87 MG/DL (ref 70–99)
HCT VFR BLD CALC: 34.5 % (ref 40.5–52.5)
HEMATOLOGY PATH CONSULT: NO
HEMOGLOBIN: 10.6 G/DL (ref 13.5–17.5)
LYMPHOCYTES ABSOLUTE: 1.8 K/UL (ref 1–5.1)
LYMPHOCYTES RELATIVE PERCENT: 21.6 %
MAGNESIUM: 1.8 MG/DL (ref 1.8–2.4)
MCH RBC QN AUTO: 17.7 PG (ref 26–34)
MCHC RBC AUTO-ENTMCNC: 30.8 G/DL (ref 31–36)
MCV RBC AUTO: 57.4 FL (ref 80–100)
MONOCYTES ABSOLUTE: 0.6 K/UL (ref 0–1.3)
MONOCYTES RELATIVE PERCENT: 7.3 %
NEUTROPHILS ABSOLUTE: 5.7 K/UL (ref 1.7–7.7)
NEUTROPHILS RELATIVE PERCENT: 66.6 %
PDW BLD-RTO: 17.9 % (ref 12.4–15.4)
PERFORMED ON: ABNORMAL
PERFORMED ON: NORMAL
PERFORMED ON: NORMAL
PHOSPHORUS: 2.8 MG/DL (ref 2.5–4.9)
PLATELET # BLD: 277 K/UL (ref 135–450)
PMV BLD AUTO: 8.5 FL (ref 5–10.5)
POTASSIUM SERPL-SCNC: 3.5 MMOL/L (ref 3.5–5.1)
RBC # BLD: 6 M/UL (ref 4.2–5.9)
SODIUM BLD-SCNC: 137 MMOL/L (ref 136–145)
WBC # BLD: 8.5 K/UL (ref 4–11)

## 2021-06-27 PROCEDURE — 36592 COLLECT BLOOD FROM PICC: CPT

## 2021-06-27 PROCEDURE — 2000000000 HC ICU R&B

## 2021-06-27 PROCEDURE — 6360000002 HC RX W HCPCS: Performed by: NURSE PRACTITIONER

## 2021-06-27 PROCEDURE — 2500000003 HC RX 250 WO HCPCS: Performed by: NURSE PRACTITIONER

## 2021-06-27 PROCEDURE — 80048 BASIC METABOLIC PNL TOTAL CA: CPT

## 2021-06-27 PROCEDURE — 84100 ASSAY OF PHOSPHORUS: CPT

## 2021-06-27 PROCEDURE — 2580000003 HC RX 258: Performed by: NURSE PRACTITIONER

## 2021-06-27 PROCEDURE — 85025 COMPLETE CBC W/AUTO DIFF WBC: CPT

## 2021-06-27 PROCEDURE — 2580000003 HC RX 258: Performed by: INTERNAL MEDICINE

## 2021-06-27 PROCEDURE — 2700000000 HC OXYGEN THERAPY PER DAY

## 2021-06-27 PROCEDURE — 99291 CRITICAL CARE FIRST HOUR: CPT | Performed by: INTERNAL MEDICINE

## 2021-06-27 PROCEDURE — 2500000003 HC RX 250 WO HCPCS: Performed by: INTERNAL MEDICINE

## 2021-06-27 PROCEDURE — 6360000002 HC RX W HCPCS: Performed by: INTERNAL MEDICINE

## 2021-06-27 PROCEDURE — 83735 ASSAY OF MAGNESIUM: CPT

## 2021-06-27 PROCEDURE — 94003 VENT MGMT INPAT SUBQ DAY: CPT

## 2021-06-27 PROCEDURE — APPNB15 APP NON BILLABLE TIME 0-15 MINS: Performed by: NURSE PRACTITIONER

## 2021-06-27 PROCEDURE — 94761 N-INVAS EAR/PLS OXIMETRY MLT: CPT

## 2021-06-27 PROCEDURE — 6370000000 HC RX 637 (ALT 250 FOR IP): Performed by: INTERNAL MEDICINE

## 2021-06-27 RX ORDER — OXYCODONE HYDROCHLORIDE 5 MG/1
5 TABLET ORAL EVERY 6 HOURS PRN
Status: DISCONTINUED | OUTPATIENT
Start: 2021-06-27 | End: 2021-06-28 | Stop reason: HOSPADM

## 2021-06-27 RX ORDER — POTASSIUM CHLORIDE 29.8 MG/ML
20 INJECTION INTRAVENOUS ONCE
Status: COMPLETED | OUTPATIENT
Start: 2021-06-27 | End: 2021-06-27

## 2021-06-27 RX ORDER — MAGNESIUM SULFATE IN WATER 40 MG/ML
2000 INJECTION, SOLUTION INTRAVENOUS ONCE
Status: COMPLETED | OUTPATIENT
Start: 2021-06-27 | End: 2021-06-27

## 2021-06-27 RX ADMIN — MIDAZOLAM 2 MG: 1 INJECTION INTRAMUSCULAR; INTRAVENOUS at 04:39

## 2021-06-27 RX ADMIN — DEXTROSE MONOHYDRATE 12.5 G: 25 INJECTION, SOLUTION INTRAVENOUS at 22:14

## 2021-06-27 RX ADMIN — MIDAZOLAM 2 MG: 1 INJECTION INTRAMUSCULAR; INTRAVENOUS at 00:02

## 2021-06-27 RX ADMIN — DEXMEDETOMIDINE 0.5 MCG/KG/HR: 100 INJECTION, SOLUTION, CONCENTRATE INTRAVENOUS at 19:46

## 2021-06-27 RX ADMIN — FAMOTIDINE 20 MG: 10 INJECTION, SOLUTION INTRAVENOUS at 20:51

## 2021-06-27 RX ADMIN — CHLORHEXIDINE GLUCONATE 15 ML: 1.2 RINSE ORAL at 07:27

## 2021-06-27 RX ADMIN — ENOXAPARIN SODIUM 40 MG: 40 INJECTION SUBCUTANEOUS at 20:51

## 2021-06-27 RX ADMIN — PROPOFOL 15 MCG/KG/MIN: 10 INJECTION, EMULSION INTRAVENOUS at 04:40

## 2021-06-27 RX ADMIN — METHADONE HYDROCHLORIDE 80 MG: 10 TABLET ORAL at 07:10

## 2021-06-27 RX ADMIN — DEXMEDETOMIDINE 0.9 MCG/KG/HR: 100 INJECTION, SOLUTION, CONCENTRATE INTRAVENOUS at 07:03

## 2021-06-27 RX ADMIN — MIDAZOLAM 2 MG: 1 INJECTION INTRAMUSCULAR; INTRAVENOUS at 01:35

## 2021-06-27 RX ADMIN — MIDAZOLAM 2 MG: 1 INJECTION INTRAMUSCULAR; INTRAVENOUS at 05:52

## 2021-06-27 RX ADMIN — DEXMEDETOMIDINE 0.7 MCG/KG/HR: 100 INJECTION, SOLUTION, CONCENTRATE INTRAVENOUS at 11:57

## 2021-06-27 RX ADMIN — FAMOTIDINE 20 MG: 10 INJECTION, SOLUTION INTRAVENOUS at 07:10

## 2021-06-27 RX ADMIN — POTASSIUM CHLORIDE 20 MEQ: 29.8 INJECTION, SOLUTION INTRAVENOUS at 08:43

## 2021-06-27 RX ADMIN — DEXMEDETOMIDINE 0.9 MCG/KG/HR: 100 INJECTION, SOLUTION, CONCENTRATE INTRAVENOUS at 01:16

## 2021-06-27 RX ADMIN — DEXMEDETOMIDINE 0.9 MCG/KG/HR: 100 INJECTION, SOLUTION, CONCENTRATE INTRAVENOUS at 04:39

## 2021-06-27 RX ADMIN — ENOXAPARIN SODIUM 40 MG: 40 INJECTION SUBCUTANEOUS at 07:11

## 2021-06-27 RX ADMIN — DEXMEDETOMIDINE 1 MCG/KG/HR: 100 INJECTION, SOLUTION, CONCENTRATE INTRAVENOUS at 09:24

## 2021-06-27 RX ADMIN — Medication 10 ML: at 07:27

## 2021-06-27 RX ADMIN — MAGNESIUM SULFATE HEPTAHYDRATE 2000 MG: 40 INJECTION, SOLUTION INTRAVENOUS at 08:43

## 2021-06-27 RX ADMIN — Medication 10 ML: at 20:52

## 2021-06-27 ASSESSMENT — PAIN SCALES - GENERAL
PAINLEVEL_OUTOF10: 0

## 2021-06-27 ASSESSMENT — PULMONARY FUNCTION TESTS
PIF_VALUE: 20
PIF_VALUE: 21
PIF_VALUE: 17
PIF_VALUE: 24
PIF_VALUE: 21
PIF_VALUE: 22
PIF_VALUE: 15
PIF_VALUE: 11
PIF_VALUE: 20
PIF_VALUE: 11
PIF_VALUE: 24
PIF_VALUE: 14

## 2021-06-27 NOTE — PROGRESS NOTES
Mother and Father in waiting room. Dtr Jose Villalobos at bedside visiting.  Pt states he would like to eat   Order for carb control diet obtained from MD Elle Solis

## 2021-06-27 NOTE — PROGRESS NOTES
Mother and Father at bedside. Update given. Pt is Alert with appropriate conversation. 2L/min NC unlabored breathing. No SOB or difficulty noted.

## 2021-06-27 NOTE — PROGRESS NOTES
Pulmonary Progress Note    Date of Admission: 6/25/2021   LOS: 2 days     CC:  Chief Complaint   Patient presents with    Drug Overdose     unknown substance, has HX of heroin use, now uses suboxone. family gave pt 12mg narcan. Pt still remains altered. Assessment/Plan     Acute hypoxemic respiratory failure SPO2 less than 90% on room air  -Full vent support, wean supplemental oxygen goal SPO2 90%  -Awake and following commands, flipped SBT, anticipate extubation today     Acute toxic encephalopathy, due to  Unintentional drug overdose  -UDS with amphetamines, benzodiazepines, methadone and PCP  -Continue supportive care     Polysubstance abuse     Morbid obesity     -Peridex  Pepcid  Lovenox     Due to the immediate potential for life-threatening deterioration due to respiratory failure and drug overdose, I spent 32 minutes providing critical care. This time is excluding time spent performing separately billable procedures.       HPI/Subjective  No acute events overnight. More awake and following commands today    ROS: Unable to obtain due to mechanical ventilation      Intake/Output Summary (Last 24 hours) at 6/27/2021 0714  Last data filed at 6/27/2021 3340  Gross per 24 hour   Intake 3908.34 ml   Output 2528 ml   Net 1380.34 ml         PHYSICAL EXAM:   Blood pressure 135/86, pulse 64, temperature 98.8 °F (37.1 °C), temperature source Axillary, resp. rate 15, height 5' 10\" (1.778 m), weight (!) 375 lb 10.6 oz (170.4 kg), SpO2 96 %. Gen:  No acute distress. Eyes: PERRL. Anicteric sclera. No conjunctival injection. ENT: No discharge. OP with ETT. External appearance of ears and nose normal.  Neck: Trachea midline. No mass   Resp:  No crackles. No wheezes. No rhonchi. No dullness on percussion. CV: Regular rate. Regular rhythm. No murmur or rub. No edema. GI: Soft, Non-tender. Non-distended. +BS  Skin: Warm, dry, w/o erythema. Lymph: No cervical or supraclavicular LAD.    M/S: No cyanosis. No clubbing. Neuro:  no focal neurologic deficit. Awake on vent      Medications:    Scheduled Meds:   chlorhexidine  15 mL Mouth/Throat BID    famotidine (PEPCID) injection  20 mg Intravenous BID    enoxaparin  40 mg Subcutaneous BID    methadone  80 mg Oral Daily    sodium chloride flush  5-40 mL Intravenous 2 times per day       Continuous Infusions:   dextrose 100 mL/hr (06/27/21 0713)    fentaNYL Stopped (06/26/21 1020)    dexmedetomidine 1 mcg/kg/hr (06/27/21 0713)    propofol 15 mcg/kg/min (06/27/21 0440)    sodium chloride         PRN Meds:  glucose, dextrose, glucagon (rDNA), dextrose, fentanNYL, midazolam, ondansetron **OR** ondansetron, polyethylene glycol, acetaminophen **OR** acetaminophen, sodium chloride flush, sodium chloride    Labs reviewed:  CBC:   Recent Labs     06/25/21  1244 06/26/21  0438 06/27/21  0350   WBC 9.7 8.7 8.5   HGB 11.0* 10.9* 10.6*   HCT 35.9* 35.1* 34.5*   MCV 57.7* 57.5* 57.4*    283 277     BMP:   Recent Labs     06/25/21  1244 06/26/21  0438 06/27/21  0350    142 137   K 3.8 3.6 3.5    105 102   CO2 25 29 29   PHOS  --  3.0 2.8   BUN 13 8 10   CREATININE 0.9 0.7* 0.8*     LIVER PROFILE:   Recent Labs     06/25/21  1244   AST 28   ALT 45*   BILIDIR <0.2   BILITOT 0.4   ALKPHOS 90     PT/INR: No results for input(s): PROTIME, INR in the last 72 hours. APTT: No results for input(s): APTT in the last 72 hours. UA:  Recent Labs     06/25/21  1312   COLORU DK YELLOW   PHUR 6.5  6.5   WBCUA 4   RBCUA 1   CLARITYU Clear   SPECGRAV 1.027   LEUKOCYTESUR Negative   UROBILINOGEN 1.0   BILIRUBINUR Negative   BLOODU Negative   GLUCOSEU Negative     No results for input(s): PH, PCO2, PO2 in the last 72 hours. Films:  Radiology Review:  Pertinent images / reports were reviewed as a part of this visit.        Echo Complete  Transthoracic Echocardiography Report (TTE)     Demographics      Patient Name       Magdalena Huber 89 R      Date of Study 06/26/2021        Gender              Male      Patient Number     [id number]        Date of Birth       1975      Visit Number       [id number]         Age                 51 year(s)      Accession Number   [id number]        Room Number         2123      Corporate ID       [id number]           Sonographer         Fer Workman                                                            [facility]      Ordering Physician Brennan Dietrich MD Interpreting        [address] [place] [address].                                        Physician           Aziza Silvestre MD     Procedure    Type of Study      TTE procedure:ECHOCARDIOGRAM COMPLETE 2D W DOPPLER W COLOR, ECHO 2D   W/DOPPLER/COLOR/CONTRAST. Procedure Date  Date: 06/26/2021 Start: 01:05 PM    Study [facility]  Technical Quality: Limited visualization due to poor acoustical window. Indications:Congestive heart failure. Additional Indications:Drug Overdose. Patient Status: Routine    Contrast Medium: Definity. Amount - 2 ml    Height: 70 inches Weight: 375.01 pounds BSA: 2.73 m2 BMI: 53.81 kg/m2    Rhythm: Within normal limits HR: 68 bpm BP: 161/103 mmHg     Conclusions      Summary   Suboptimal image quality. Definity contrast administered. Normal left ventricle size, wall thickness, and systolic function with an   estimated ejection fraction of 60-65%. No regional wall motion abnormalities   are seen. The right ventricle is not well visualized. Mild tricuspid regurgitation. Signature      ------------------------------------------------------------------   Electronically signed by Vale Contreras MD   (Interpreting physician) on 06/26/2021 at 03:01 PM   ------------------------------------------------------------------      Findings      Left Ventricle   Suboptimal image quality. Definity contrast administered.    Normal left ventricle size, wall thickness, and systolic function with an   estimated ejection fraction of 60-65%. No regional wall motion abnormalities   are seen. Likely normal diastolic function. Mitral Valve   Mitral valve is structurally normal.   No evidence of mitral regurgitation or stenosis. Left Atrium   Left atrium is of normal size. Aortic Valve   The aortic valve leaflets are not well visualized. No evidence of aortic valve regurgitation. No evidence of aortic valve stenosis. Aorta   The aortic root is mildly dilated. The ascending aorta is normal in size. Right Ventricle   The right ventricle is not well visualized. Tricuspid Valve   Mild tricuspid regurgitation. No evidence of tricuspid stenosis. The tricuspid valve is not well visualized. Right Atrium   The right atrium is mildly dilated. Pulmonic Valve   No evidence of pulmonic valve stenosis. The pulmonic valve is not well visualized. Pericardial Effusion   No pericardial effusion noted. Pleural Effusion   No pleural effusion. Miscellaneous   IVC size is dilated (>2.1 cm). Patient intubated.      M-Mode/2D Measurements (cm)      LV Diastolic Dimension: 4.01 cm LV Systolic Dimension: 6.28 cm   LV Septum Diastolic: 1 cm   LV PW Diastolic: 1 cm           AO Root Dimension: 3.7 cm                                      LA Area: 25 cm2   LVOT: 2 cm                      LA volume/Index: 82.8 ml /30 ml/m2     Doppler Measurements      AV Peak Velocity: 106 cm/s     MV Peak E-Wave: 42.4 cm/s   AV Peak Gradient: 4.49 mmHg    MV Peak A-Wave: 47.3 cm/s   AV Mean Gradient: 3 mmHg       MV E/A Ratio: 0.9   LVOT Peak Velocity: 78.9 cm/s  MV Mean Gradient: 1 mmHg   AV Area (Continuity):2.55 cm2  MV Max P mmHg                                  MV Vmax:65.1 cm/s   TR Velocity:214 cm/s           MV VTI:19.3 cm/s   TR Gradient:18.32 mmHg   Estimated RAP:15 mmHg          MV Area (continuity): 2.67 cm2   E' Septal Velocity: 6.66 cm/s  MV Deceleration Time: 293 msec   E' Lateral Velocity: 11.6 cm/s   PV Peak Velocity: 74.8 cm/s   PV Peak Gradient: 2.24 mmHg      Aortic Valve      Peak Velocity: 106 cm/s     Mean Velocity: 75.6 cm/s   Peak Gradient: 4.49 mmHg    Mean Gradient: 3 mmHg   Area (continuity): 2.55 cm2   AV VTI: 20.2 cm     Aorta      Aortic Root: 3.7 cm   Ascending Aorta: 3.3 cm   LVOT Diameter: 2 cm             Access  Arterial       PICC       PICC Double Lumen 96/89/44 Right Basilic (Active)   Is this a power PICC? Yes 06/26/21 0600   Site Assessment Clean;Dry; Intact 06/26/21 0600   Red Lumen Status Infusing;Capped 06/26/21 0600   Purple Lumen Status Infusing;Capped 06/26/21 0600   Exposed Catheter (cm) 0 cm 06/26/21 0600   Extremity Circumference (cm) 42 cm 06/26/21 0600   Line Care Connections checked and tightened 06/26/21 0600   Alcohol Cap Used Yes 06/26/21 0600   Dressing Status Clean;Dry; Intact 06/26/21 0600   Dressing Type Transparent; Anti-microbial patch; Securing device 06/26/21 0600   Dressing Change Due 06/30/21 06/26/21 0600   Dressing Intervention New 06/26/21 0026   Reason Not Rotated Not due 06/26/21 0600   Number of days: 0        CVC               Thank you for this consult,    Adria Conteh MD  [facility] Pulmonary, Critical Care, and Sleep Medicine

## 2021-06-27 NOTE — PROGRESS NOTES
enteric tube in satisfactory position. No change in increased interstitial markings which may be due to vascular   crowding from low lung volumes versus pulmonary vascular congestion. Atypical pneumonia cannot be excluded. ECHO:  Summary   Suboptimal image quality. Definity contrast administered. Normal left ventricle size, wall thickness, and systolic function with an   estimated ejection fraction of 60-65%. No regional wall motion abnormalities   are seen. The right ventricle is not well visualized. Mild tricuspid regurgitation. Assessment:    Principal Problem:    Drug overdose, accidental or unintentional, initial encounter  Active Problems:    Drug overdose    Polysubstance abuse ([address]    Super obesity    Acute metabolic encephalopathy  Resolved Problems:    * No resolved hospital problems. *      Plan:  1. Drug overdose - unintentional - on methadone, lives with mom and dad in basement  2. Acute resp failure with hypoxia - due to overdose - hopefully able to extubate today  3. Polysubstance abuse - pt already on 80mg methadone daily and is also using other drugs - tox screen pos for amphetamine, benzos, methadone and PCP  4. Super Obesity, Body mass index is Body mass index is 53.9 kg/m². .  [] Class I  30.0 to 34.9 kg/m2  [] Class II  35.0 to 39.9 kg/m2  [] Class III  ?40 kg/m2 (also referred to as severe, extreme, morbid or massive obesity)   [x] Super Obesity   > 50% kg/m2  [] Super Super Obesity   > 52% kg/m2  Complicating assessment and treatment. Obesity Places the patient at risk for multiple co-morbidities as well as early death and may be contributing to the patient's presentation. Supportive care. Encourage therapeutic lifestyle changes. Continue current regimen/therapies. Monitor. Adjust medical regimen as appropriate. 5.  Acute metabolic encephalopathy POA due to overdose, now improved  6.   Leg edema - ECHO shows normal EF      Prognosis:  Good  Short term and poor long term if he continues to use illegal drugs    Code status:  Full code    DVT prophylaxis: Lovenox  GI prophylaxis: H2 blocker  Antibiotic prophylaxis indicated:   no  VAP:  Peridex  Nasal decolonization:  not indicated  Diet:  Diet NPO    Disposition:  Home    Medications:  Scheduled Meds:   magnesium sulfate  2,000 mg Intravenous Once    potassium chloride  20 mEq Intravenous Once    chlorhexidine  15 mL Mouth/Throat BID    famotidine (PEPCID) injection  20 mg Intravenous BID    enoxaparin  40 mg Subcutaneous BID    methadone  80 mg Oral Daily    sodium chloride flush  5-40 mL Intravenous 2 times per day       PRN Meds:  glucose, dextrose, glucagon (rDNA), dextrose, fentanNYL, midazolam, ondansetron **OR** ondansetron, polyethylene glycol, acetaminophen **OR** acetaminophen, sodium chloride flush, sodium chloride    IV:   dextrose 100 mL/hr (06/27/21 0713)    fentaNYL Stopped (06/26/21 1020)    dexmedetomidine 1 mcg/kg/hr (06/27/21 0713)    propofol 10 mcg/kg/min (06/27/21 0725)    sodium chloride           Intake/Output Summary (Last 24 hours) at 6/27/2021 0822  Last data filed at 6/27/2021 6218  Gross per 24 hour   Intake 3898.34 ml   Output 2528 ml   Net 1370.34 ml       Results:  CBC:   Recent Labs     06/25/21  1244 06/26/21  0438 06/27/21  0350   WBC 9.7 8.7 8.5   HGB 11.0* 10.9* 10.6*   HCT 35.9* 35.1* 34.5*   MCV 57.7* 57.5* 57.4*    283 277     BMP:   Recent Labs     06/25/21  1244 06/26/21  0438 06/27/21  0350    142 137   K 3.8 3.6 3.5    105 102   CO2 25 29 29   PHOS  --  3.0 2.8   BUN 13 8 10   CREATININE 0.9 0.7* 0.8*     Mag: No results for input(s): MAG in the last 72 hours. LIVER PROFILE:   Recent Labs     06/25/21  1244   AST 28   ALT 45*   BILIDIR <0.2   BILITOT 0.4   ALKPHOS 90     PT/INR: No results for input(s): PROTIME, INR in the last 72 hours. APTT: No results for input(s): APTT in the last 72 hours.   UA:  Recent Labs     06/25/21  1312   COLORU DK YELLOW   PHUR 6.5  6.5   WBCUA 4   RBCUA 1   CLARITYU Clear   SPECGRAV 1.027   LEUKOCYTESUR Negative   UROBILINOGEN 1.0   BILIRUBINUR Negative   BLOODU Negative   GLUCOSEU Negative       ABG:   Lab Results   Component Value Date    PHART 7.427 06/25/2021    TVC0RXL 42.9 06/25/2021    PO2ART 94.2 06/25/2021    DYR4PTL 28.3 06/25/2021    BEART 3.4 06/25/2021    HCO1WQM 29.6 06/25/2021    V2TBQWTT 98.4 06/25/2021       Lab Results   Component Value Date    CALCIUM 8.3 06/27/2021    PHOS 2.8 06/27/2021             Electronically signed by Chepe Muñoz MD on 6/27/2021 at 8:22 AM

## 2021-06-27 NOTE — PROGRESS NOTES
Precedex turned off at this time. Pt is calm. Alert and oriented. Appropriate conversation. Family at bedside visiting.

## 2021-06-28 VITALS
WEIGHT: 315 LBS | HEART RATE: 93 BPM | RESPIRATION RATE: 18 BRPM | HEIGHT: 70 IN | OXYGEN SATURATION: 92 % | BODY MASS INDEX: 45.1 KG/M2 | TEMPERATURE: 99.2 F | DIASTOLIC BLOOD PRESSURE: 97 MMHG | SYSTOLIC BLOOD PRESSURE: 158 MMHG

## 2021-06-28 LAB
ANION GAP SERPL CALCULATED.3IONS-SCNC: 11 MMOL/L (ref 3–16)
BASOPHILS ABSOLUTE: 0 K/UL (ref 0–0.2)
BASOPHILS ABSOLUTE: 0.1 K/UL (ref 0–0.2)
BASOPHILS RELATIVE PERCENT: 0.4 %
BASOPHILS RELATIVE PERCENT: 0.8 %
BUN BLDV-MCNC: 8 MG/DL (ref 7–20)
CALCIUM SERPL-MCNC: 8 MG/DL (ref 8.3–10.6)
CHLORIDE BLD-SCNC: 101 MMOL/L (ref 99–110)
CO2: 26 MMOL/L (ref 21–32)
CREAT SERPL-MCNC: 0.6 MG/DL (ref 0.9–1.3)
EOSINOPHILS ABSOLUTE: 0.3 K/UL (ref 0–0.6)
EOSINOPHILS ABSOLUTE: 0.4 K/UL (ref 0–0.6)
EOSINOPHILS RELATIVE PERCENT: 2.2 %
EOSINOPHILS RELATIVE PERCENT: 4.5 %
GFR AFRICAN AMERICAN: >60
GFR NON-AFRICAN AMERICAN: >60
GLUCOSE BLD-MCNC: 83 MG/DL (ref 70–99)
GLUCOSE BLD-MCNC: 88 MG/DL (ref 70–99)
HCT VFR BLD CALC: 33.6 % (ref 40.5–52.5)
HCT VFR BLD CALC: 35.1 % (ref 40.5–52.5)
HEMATOLOGY PATH CONSULT: NO
HEMATOLOGY PATH CONSULT: NO
HEMATOLOGY PATH CONSULT: NORMAL
HEMOGLOBIN: 10.4 G/DL (ref 13.5–17.5)
HEMOGLOBIN: 10.9 G/DL (ref 13.5–17.5)
LYMPHOCYTES ABSOLUTE: 2.2 K/UL (ref 1–5.1)
LYMPHOCYTES ABSOLUTE: 2.3 K/UL (ref 1–5.1)
LYMPHOCYTES RELATIVE PERCENT: 16.8 %
LYMPHOCYTES RELATIVE PERCENT: 25.2 %
MAGNESIUM: 1.9 MG/DL (ref 1.8–2.4)
MCH RBC QN AUTO: 17.6 PG (ref 26–34)
MCH RBC QN AUTO: 17.8 PG (ref 26–34)
MCHC RBC AUTO-ENTMCNC: 30.9 G/DL (ref 31–36)
MCHC RBC AUTO-ENTMCNC: 31 G/DL (ref 31–36)
MCV RBC AUTO: 56.7 FL (ref 80–100)
MCV RBC AUTO: 57.5 FL (ref 80–100)
MONOCYTES ABSOLUTE: 0.4 K/UL (ref 0–1.3)
MONOCYTES ABSOLUTE: 1.3 K/UL (ref 0–1.3)
MONOCYTES RELATIVE PERCENT: 5.1 %
MONOCYTES RELATIVE PERCENT: 9.4 %
NEUTROPHILS ABSOLUTE: 5.6 K/UL (ref 1.7–7.7)
NEUTROPHILS ABSOLUTE: 9.5 K/UL (ref 1.7–7.7)
NEUTROPHILS RELATIVE PERCENT: 64.8 %
NEUTROPHILS RELATIVE PERCENT: 70.8 %
PDW BLD-RTO: 17.2 % (ref 12.4–15.4)
PDW BLD-RTO: 17.8 % (ref 12.4–15.4)
PERFORMED ON: NORMAL
PHOSPHORUS: 3.2 MG/DL (ref 2.5–4.9)
PLATELET # BLD: 280 K/UL (ref 135–450)
PLATELET # BLD: 283 K/UL (ref 135–450)
PMV BLD AUTO: 8.3 FL (ref 5–10.5)
PMV BLD AUTO: 8.4 FL (ref 5–10.5)
POTASSIUM SERPL-SCNC: 3.4 MMOL/L (ref 3.5–5.1)
PROCALCITONIN: 0.25 NG/ML (ref 0–0.15)
RBC # BLD: 5.92 M/UL (ref 4.2–5.9)
RBC # BLD: 6.11 M/UL (ref 4.2–5.9)
SODIUM BLD-SCNC: 138 MMOL/L (ref 136–145)
WBC # BLD: 13.4 K/UL (ref 4–11)
WBC # BLD: 8.7 K/UL (ref 4–11)

## 2021-06-28 PROCEDURE — 94761 N-INVAS EAR/PLS OXIMETRY MLT: CPT

## 2021-06-28 PROCEDURE — 85025 COMPLETE CBC W/AUTO DIFF WBC: CPT

## 2021-06-28 PROCEDURE — 2580000003 HC RX 258: Performed by: NURSE PRACTITIONER

## 2021-06-28 PROCEDURE — 99232 SBSQ HOSP IP/OBS MODERATE 35: CPT | Performed by: INTERNAL MEDICINE

## 2021-06-28 PROCEDURE — 97530 THERAPEUTIC ACTIVITIES: CPT

## 2021-06-28 PROCEDURE — 6360000002 HC RX W HCPCS: Performed by: NURSE PRACTITIONER

## 2021-06-28 PROCEDURE — 36415 COLL VENOUS BLD VENIPUNCTURE: CPT

## 2021-06-28 PROCEDURE — APPNB15 APP NON BILLABLE TIME 0-15 MINS: Performed by: NURSE PRACTITIONER

## 2021-06-28 PROCEDURE — 80048 BASIC METABOLIC PNL TOTAL CA: CPT

## 2021-06-28 PROCEDURE — 97161 PT EVAL LOW COMPLEX 20 MIN: CPT

## 2021-06-28 PROCEDURE — 6370000000 HC RX 637 (ALT 250 FOR IP): Performed by: INTERNAL MEDICINE

## 2021-06-28 PROCEDURE — 84145 PROCALCITONIN (PCT): CPT

## 2021-06-28 PROCEDURE — 2500000003 HC RX 250 WO HCPCS: Performed by: NURSE PRACTITIONER

## 2021-06-28 PROCEDURE — 84100 ASSAY OF PHOSPHORUS: CPT

## 2021-06-28 PROCEDURE — 2580000003 HC RX 258: Performed by: INTERNAL MEDICINE

## 2021-06-28 PROCEDURE — 83735 ASSAY OF MAGNESIUM: CPT

## 2021-06-28 RX ORDER — POTASSIUM CHLORIDE 20 MEQ/1
40 TABLET, EXTENDED RELEASE ORAL ONCE
Status: COMPLETED | OUTPATIENT
Start: 2021-06-28 | End: 2021-06-28

## 2021-06-28 RX ORDER — AMOXICILLIN AND CLAVULANATE POTASSIUM 875; 125 MG/1; MG/1
1 TABLET, FILM COATED ORAL 2 TIMES DAILY
Qty: 20 TABLET | Refills: 0 | Status: SHIPPED | OUTPATIENT
Start: 2021-06-28 | End: 2021-07-08

## 2021-06-28 RX ORDER — MAGNESIUM SULFATE IN WATER 40 MG/ML
2000 INJECTION, SOLUTION INTRAVENOUS ONCE
Status: COMPLETED | OUTPATIENT
Start: 2021-06-28 | End: 2021-06-28

## 2021-06-28 RX ORDER — TRIAMTERENE AND HYDROCHLOROTHIAZIDE 37.5; 25 MG/1; MG/1
1 TABLET ORAL DAILY
Qty: 30 TABLET | Refills: 5 | Status: SHIPPED | OUTPATIENT
Start: 2021-06-28

## 2021-06-28 RX ADMIN — ENOXAPARIN SODIUM 40 MG: 40 INJECTION SUBCUTANEOUS at 08:27

## 2021-06-28 RX ADMIN — Medication 10 ML: at 08:36

## 2021-06-28 RX ADMIN — DEXMEDETOMIDINE 0.5 MCG/KG/HR: 100 INJECTION, SOLUTION, CONCENTRATE INTRAVENOUS at 04:58

## 2021-06-28 RX ADMIN — POTASSIUM CHLORIDE 40 MEQ: 1500 TABLET, EXTENDED RELEASE ORAL at 04:46

## 2021-06-28 RX ADMIN — METHADONE HYDROCHLORIDE 80 MG: 10 TABLET ORAL at 08:27

## 2021-06-28 RX ADMIN — DEXMEDETOMIDINE 0.5 MCG/KG/HR: 100 INJECTION, SOLUTION, CONCENTRATE INTRAVENOUS at 00:15

## 2021-06-28 RX ADMIN — MAGNESIUM SULFATE HEPTAHYDRATE 2000 MG: 40 INJECTION, SOLUTION INTRAVENOUS at 08:28

## 2021-06-28 ASSESSMENT — PAIN SCALES - GENERAL
PAINLEVEL_OUTOF10: 0
PAINLEVEL_OUTOF10: 0
PAINLEVEL_OUTOF10: 6
PAINLEVEL_OUTOF10: 0
PAINLEVEL_OUTOF10: 0

## 2021-06-28 NOTE — PROGRESS NOTES
Ambulated pt to bathroom per pt request. Stand by assist. Pt did require assistance with diana care post BM. Pt tolerated ambulation fairly well.

## 2021-06-28 NOTE — CARE COORDINATION
Patient left the building with his family. No answer regarding rehab decision. Call to [place] at [facility], [phone], he states that he spoke with the patient who wanted to go home and discuss the financial aspect with him family and [place] was going to call him back at 4 pm to discuss his treatment going forward. Phone numbers for [facility] were both left with the patient and his mother to contact if needed. Patient also followed at [facility] for Methadone and he wasn't sure if he wanted to return there or try [facility].     Abdiel Phillips RN, BSN, Case Management  Phone: [phone]  Electronically signed by Abdiel Phillips RN on 6/28/2021 at 3:43 PM

## 2021-06-28 NOTE — PROGRESS NOTES
1838: BG 70. Dinner at bedside. Pt ate 25% and drank some lemonade. 2056: BG 63- 4 oz juice given  2134: 65-4 oz juice given  2206: BG 44 12.5 grams Dextrose given  2223: BG recheck 148  2245L ; Pt resting comfortably in the bed. Eyes closed but awakens easily with voice/noise. Pt is very cooperative at this time.

## 2021-06-28 NOTE — PROGRESS NOTES
Pt with discharge order. D/c papers reviewed, meds deliver from pharmacy. Pt educated on safe home care. IVs removed. Faith catheter preciously removed and pt successfully voided 300 aki urine.   Pt ambulated off unit with family,

## 2021-06-28 NOTE — PROGRESS NOTES
Physical Therapy    Facility/Department: [address] ICU  Initial Assessment/Discharge Summary    NAME: Binh Michelle  : 1975  MRN: [id number]    Date of Service: 2021    Discharge Recommendations:  Home with assist PRN   PT Equipment Recommendations  Equipment Needed: No    Assessment   Body structures, Functions, Activity limitations: Decreased functional mobility   Assessment: 52 y/o male admit 2021 with Polysubstance Abuse, Drug Overdose. -2021 Pt Intubated. PTA pt living with parents (and his dtr) in multi level home; independent daily care and functional mobility. Pt reports adequate assist/support upon return home. No further PT indicated at this time. Prognosis: Good  Decision Making: Low Complexity  History: 52 y/o male admit 2021 with Polysubstance Abuse, Drug Overdose. -2021 Pt Intubated. Exam: See above. Clinical Presentation: See above. Patient Education: Role of PT, POC, Need to call for assist.  Barriers to Learning: None. REQUIRES PT FOLLOW UP: No  Activity Tolerance  Activity Tolerance: Patient Tolerated treatment well       Patient Diagnosis(es): The primary encounter diagnosis was Drug overdose, undetermined intent, initial encounter. A diagnosis of Polysubstance abuse ([facility] [address] was also pertinent to this visit. has no past medical history on file. has no past surgical history on file. Restrictions  Restrictions/Precautions  Restrictions/Precautions: Up as Tolerated  Vision/Hearing  Vision: Within Functional Limits  Hearing: Within functional limits     Subjective  General  Chart Reviewed: Yes  Patient assessed for rehabilitation services?: Yes  Additional Pertinent Hx: 52 y/o male admit 2021 with Polysubstance Abuse, Drug Overdose. -2021 Pt Intubated. Family / Caregiver Present: Yes (Parents.)  Referring Practitioner: Lakhwinder Aguilar NP  Diagnosis: Polysubstance Abuse, Drug Overdose.   Follows Commands: Within Functional Limits  Subjective  Subjective: Pt agreeable to PT Eval/Rx. Pain Screening  Patient Currently in Pain: No          Orientation  Orientation  Overall Orientation Status: Within Functional Limits  Social/Functional History  Social/Functional History  Lives With: Family (Parents, Dtr.)  Type of Home: House  Home Layout: Multi-level (2 story with basement. Pt sleeps lower level. Full bath main level.)  Home Access: Stairs to enter with rails (4 steps to enter.)  Bathroom Shower/Tub: Walk-in shower  Bathroom Toilet: Standard  Bathroom Accessibility: Accessible  Home Equipment:  (No DME pta.)  ADL Assistance: Independent  Homemaking Assistance:  (Shared with family.)  Ambulation Assistance: Independent (Without assist device pta.)  Transfer Assistance: Independent  Active : Yes  Type of occupation: Works for [employer]. Cognition   Cognition  Overall Cognitive Status: WFL    Objective          AROM RLE (degrees)  RLE AROM: WFL  AROM LLE (degrees)  LLE AROM : WFL  AROM RUE (degrees)  RUE AROM : WFL  AROM LUE (degrees)  LUE AROM : WFL  Strength RLE  Strength RLE: WFL  Strength LLE  Strength LLE: WFL  Strength RUE  Strength RUE: WFL  Strength LUE  Strength LUE: WFL        Bed mobility  Supine to Sit: Independent  Transfers  Sit to Stand: Supervision  Stand to sit: Supervision  Ambulation  Ambulation?: Yes  Ambulation 1  Device: No Device  Distance: Pt amb 200' without assist device Supervision only. No LE buckling/giving way. Slow/guarded although improving as distance progressed. No LOB noted. Plan   Plan  Times per week: D/C Acute Care PT Services. Safety Devices  Type of devices: Call light within reach, Left in chair, Nurse notified (Family at bedside. )        AM-PAC Score  AM-PAC Inpatient Mobility Raw Score : 24 (06/28/21 1214)  AM-PAC Inpatient T-Scale Score : 61.14 (06/28/21 1214)  Mobility Inpatient CMS 0-100% Score: 0 (06/28/21 1214)  Mobility Inpatient CMS G-Code Modifier : [address] (06/28/21 1214)          Goals  Short term goals  Time Frame for Short term goals: No Acute Care PT Goals Identified. Patient Goals   Patient goals : Go home.        Therapy Time   Individual Concurrent Group Co-treatment   Time In 1000         Time Out 1030         Minutes [address] S[address]

## 2021-06-28 NOTE — PROGRESS NOTES
21  0438 21  0350 21  0334    137 138   K 3.6 3.5 3.4*    102 101   CO2 29 29 26   PHOS 3.0 2.8 3.2   BUN 8 10 8   CREATININE 0.7* 0.8* 0.6*     LIVER PROFILE:   Recent Labs     21  1244   AST 28   ALT 45*   BILIDIR <0.2   BILITOT 0.4   ALKPHOS 90     PT/INR: No results for input(s): PROTIME, INR in the last 72 hours. APTT: No results for input(s): APTT in the last 72 hours. UA:  Recent Labs     21  1312   COLORU DK YELLOW   PHUR 6.5  6.5   WBCUA 4   RBCUA 1   CLARITYU Clear   SPECGRAV 1.027   LEUKOCYTESUR Negative   UROBILINOGEN 1.0   BILIRUBINUR Negative   BLOODU Negative   GLUCOSEU Negative     No results for input(s): PH, PCO2, PO2 in the last 72 hours. Films:  Chest imaging reports were reviewed and imaging was reviewed by me and showed no new films     AB.43/43/94    Cultures:  Negative    I reviewed the labs and images listed above    Assessment/Plan:    Acute Hypoxic Respiratory Failure with saturations less than 90% on room air  o Resolved.   On room air   Drug overdose, improved   o Abstinence   o Continue with methadone    Acute Metabolic Encephalopathy, resolved    Hypokalemia  o Replace     Remove fox   PT/OT    DVT prophylaxis  Lovenox BID     DO Gayle Guillermo Pulmonary

## 2021-06-28 NOTE — DISCHARGE SUMMARY
Hospital Medicine Discharge Summary    Patient ID: Ronnie Fournier      Patient's PCP: Comfort Haley Date: 6/25/2021     Discharge Date:  6/28/2021    Admitting Physician: Rodriguez Hebert MD     Discharge Physician: Jeremie Mora MD     Discharge Diagnoses: Active Hospital Problems    Diagnosis Date Noted    Acute respiratory failure with hypoxia ([facility] [address] [J96.01]     Hypokalemia [E87.6]     Super obesity [E66.9] 06/27/2021    Acute metabolic encephalopathy [K91.16] 06/27/2021    Drug overdose [T50.901A]     Polysubstance abuse ([address] [F19.10]     Drug overdose, accidental or unintentional, initial encounter Carangelo Savage 06/25/2021       The patient was seen and examined on day of discharge and this discharge summary is in conjunction with any daily progress note from day of discharge. Hospital Course: Patient is a 51year old male with history of polysubstance abuse, obesity, and hypertension who presented to the ED 6/25/21 due to overdose and altered mental status. There were no acute findings on CXR, head CT, or EKG. He was intubated in the ED due to encephalopathy leading to respiratory distress, extubated 6/27/21 to 2L O2 via NC. While inpatient, he received IV fluids including magnesium, maintained daily methadone dosing of 80mg/day, and was treated PRN for hypoglycemia. As of 6/28/21 his vital signs remained stable and he was breathing comfortably on room air. There was no indication of sepsis but patient's procalcitonin did come back slightly elevated and white count on discharge had increased to 13.   Will discharge on Augmentin as there was concern for aspiration on chest x-ray    Acute hypoxic respiratory failure, secondary to drug overdose   Polysubstance abuse with drug overdose  Acute toxic encephalopathy, resolved  Hypokalemia  Uncontrolled hypertension, discharged on Maxide    exam:     BP (!) 158/97   Pulse 93   Temp 99.2 °F (37.3 °C) (Oral)   Resp 18   Ht 5' 10\" (1.778 m) Wt (!) 373 lb 7.4 oz (169.4 kg)   SpO2 92%   BMI 53.59 kg/m²     General appearance: No acute distress, appears stated age and cooperative, morbidly obese  HEENT: Pupils equal, round, and reactive to light. Conjunctivae/corneas clear. Neck: Supple, with full range of motion. No jugular venous distention. Trachea midline. Respiratory:  Normal respiratory effort. Clear to auscultation, bilaterally without Rales/Wheezes/Rhonchi. Cardiovascular: Regular rate and rhythm with normal S1/S2 without murmurs, rubs or gallops. Abdomen: Soft, non-tender, non-distended with normal bowel sounds. Musculoskeletal: No clubbing, cyanosis or edema bilaterally. Full range of motion without deformity. Bilateral lower extremity edema   skin: Skin color, texture, turgor normal.  No rashes or lesions. Neurologic:  Neurovascularly intact without any focal sensory/motor deficits. Cranial nerves: II-XII intact, grossly non-focal.  Psychiatric: Alert and oriented, thought content appropriate, normal insight      Consults:     IP CONSULT TO HOSPITALIST  IP CONSULT TO CRITICAL CARE  IP CONSULT TO DIETITIAN  IP CONSULT TO SOCIAL WORK  IP CONSULT TO SOCIAL WORK  IP CONSULT TO SPIRITUAL SERVICES    Significant Diagnostic Studies:     XR CHEST PORTABLE   Final Result   Endotracheal tube and enteric tube in satisfactory position. No change in increased interstitial markings which may be due to vascular   crowding from low lung volumes versus pulmonary vascular congestion. Atypical pneumonia cannot be excluded. CT Head WO Contrast   Final Result   1. No acute intracranial abnormality. XR CHEST PORTABLE   Final Result   Faint bilateral interstitial opacities favored to be vascular congestion and   possibly mild superimposed interstitial edema although infection or   aspiration could potentially have chest x-ray appearance.              Disposition:  Discharge to home     Condition at Discharge: Stable    Discharge Instructions/Follow-up:    -Continue methadone  -Speak with  regarding resources for substance abuse treatment  -Visit PCP for continued management of hypertension  -PT/OT as recommended    Code Status:  Full Code     Activity: activity as tolerated    Diet: regular diet      Labs: For convenience and continuity at follow-up the following most recent labs are provided:    CBC:    Lab Results   Component Value Date    WBC 13.4 06/28/2021    HGB 10.4 06/28/2021    HCT 33.6 06/28/2021     06/28/2021       Renal:    Lab Results   Component Value Date     06/28/2021    K 3.4 06/28/2021    K 3.8 06/25/2021     06/28/2021    CO2 26 06/28/2021    BUN 8 06/28/2021    CREATININE 0.6 06/28/2021    CALCIUM 8.0 06/28/2021    PHOS 3.2 06/28/2021       Discharge Medications:     Current Discharge Medication List           Details   amoxicillin-clavulanate (AUGMENTIN) 875-125 MG per tablet Take 1 tablet by mouth 2 times daily for 10 days  Qty: 20 tablet, Refills: 0      triamterene-hydroCHLOROthiazide (MAXZIDE-25) 37.5-25 MG per tablet Take 1 tablet by mouth daily  Qty: 30 tablet, Refills: 5              Details   methadone (DOLOPHINE) 10 MG/ML solution Take 80 mg by mouth daily. No future appointments. Time Spent on discharge is more than 20 minutes in the examination, evaluation, counseling and review of medications and discharge plan. Signed:    Jefferson Hatfield MD   6/28/2021      Thank you Vandana Jarrett for the opportunity to be involved in this patient's care. If you have any questions or concerns please feel free to contact me at [phone].

## 2021-06-28 NOTE — PLAN OF CARE
Problem: Non-Violent Restraints  Goal: Removal from restraints as soon as assessed to be safe  Outcome: Completed  Goal: No harm/injury to patient while restraints in use  Outcome: Completed  Goal: Patient's dignity will be maintained  Outcome: Completed     Problem: Nutrition  Goal: Optimal nutrition therapy  Outcome: Completed     Problem: Skin Integrity:  Goal: Will show no infection signs and symptoms  Description: Will show no infection signs and symptoms  Outcome: Completed  Goal: Absence of new skin breakdown  Description: Absence of new skin breakdown  Outcome: Completed     Problem: Falls - Risk of:  Goal: Will remain free from falls  Description: Will remain free from falls  Outcome: Completed  Goal: Absence of physical injury  Description: Absence of physical injury  Outcome: Completed   Pt discharged without complications

## 2021-06-28 NOTE — ACP (ADVANCE CARE PLANNING)
Advance Care Planning     Advance Care Planning Activator (Inpatient)  Conversation Note      Date of ACP Conversation: 6/28/2021     Conversation Conducted with: Patient with Decision Making Capacity    ACP Activator: Gillian Raza RN    Health Care Decision Maker:     Current Designated Health Care Decision Maker:     Primary Decision Maker: Poornima Stoner - Parent - [phone]    Primary Decision Maker: Leena Carney - Parent - [phone]  Click here to complete Healthcare Decision Makers including section of the Healthcare Decision Maker Relationship (ie \"Primary\")  Today we documented desired Decision Maker(s), who is (are) NOT Legal Next of Scooter Alicea. ACP documents are required for decision maker authority. Spiritual Services referral made. Care Preferences    Ventilation: \"If you were in your present state of health and suddenly became very ill and were unable to breathe on your own, what would your preference be about the use of a ventilator (breathing machine) if it were available to you? \"      Would the patient desire the use of ventilator (breathing machine)?: yes    \"If your health worsens and it becomes clear that your chance of recovery is unlikely, what would your preference be about the use of a ventilator (breathing machine) if it were available to you? \"     Would the patient desire the use of ventilator (breathing machine)?: No      Resuscitation  \"CPR works best to restart the heart when there is a sudden event, like a heart attack, in someone who is otherwise healthy. Unfortunately, CPR does not typically restart the heart for people who have serious health conditions or who are very sick. \"    \"In the event your heart stopped as a result of an underlying serious health condition, would you want attempts to be made to restart your heart (answer \"yes\" for attempt to resuscitate) or would you prefer a natural death (answer \"no\" for do not attempt to resuscitate)? \" yes       [] Yes   [x] No Educated Patient / Micah Rodríguez regarding differences between Advance Directives and portable DNR orders. Length of ACP Conversation in minutes:  4    Conversation Outcomes:  [x] ACP discussion completed  [] Existing advance directive reviewed with patient; no changes to patient's previously recorded wishes  [] New Advance Directive completed  [] Portable Do Not Rescitate prepared for Provider review and signature  [] POLST/POST/MOLST/MOST prepared for Provider review and signature      Follow-up plan:    [] Schedule follow-up conversation to continue planning  [x] Referred individual to Provider for additional questions/concerns   [] Advised patient/agent/surrogate to review completed ACP document and update if needed with changes in condition, patient preferences or care setting    [x] This note routed to one or more involved healthcare providers               Spiritual services referral made for HCPOA.   Arlyn Arreguin RN, BSN, Case Management  Phone: [phone]  Electronically signed by Arlyn Arreguin RN on 6/28/2021 at 12:56 PM

## 2021-06-28 NOTE — PROGRESS NOTES
Pt is alert and lying in the bed. Currently on Precedex. See MAR for titrations. COWS assessment completed at each time of increasing. Pt currently states that current rate of precedex \"took the edge off\" and he is comfortable. Pt appears more comfortable.

## 2021-06-28 NOTE — PROGRESS NOTES
CLINICAL PHARMACY NOTE: MEDS TO BEDS    Total # of Prescriptions Filled: 2   The following medications were delivered to the patient:  Discharge Medication List as of 6/28/2021  1:38 PM      START taking these medications    Details   amoxicillin-clavulanate (AUGMENTIN) 875-125 MG per tablet Take 1 tablet by mouth 2 times daily for 10 days, Disp-20 tablet, R-0Normal      triamterene-hydroCHLOROthiazide (MAXZIDE-25) 37.5-25 MG per tablet Take 1 tablet by mouth daily, Disp-30 tablet, R-5Normal         ·     Additional Documentation:

## 2021-06-29 LAB
BLOOD CULTURE, ROUTINE: NORMAL
CULTURE, BLOOD 2: NORMAL

## 2021-07-28 NOTE — ED NOTES
BREAST RECONSTRUCTION WITH IMPLANTS  POST-OPERATIVE INSTRUCTIONS    Instructions     Have someone drive you home after surgery and help you at home for 1-2     days.     Get plenty of rest and follow balanced diet.     Decreased activity may promote constipation, so you may want to add     more raw fruit to your diet, and be sure to increase fluid intake.     Take pain medication as prescribed. Do not take aspirin or any products      containing aspirin for one week after surgery.     Do not drink alcohol when taking pain medications.     Do not smoke, as smoking delays healing and increases the risk of complications.    Activities     Start walking as soon as possible, this helps to reduce swelling and      lowers the chance of blood clots.     Do not drive until you are no longer taking narcotic pain medications.     Do not drive until you have full range of motion with your arms.     No overhead lifting, strenuous sports, or lifting > 5 pounds for 3-6 weeks.    Incision Care     You may shower after 24 hours. The ACE wrap (if used) may be rewrapped as needed (if too tight or loose). Use it for support and may subtitute with a sports bra if preferred.      Avoid exposing scars to sun for at least 12 months.     Always use a strong sunblock, if sun exposure is unavoidable (SPF 50 or     greater).     Keep surgical tape on until it peels off.     Keep incisions clean and inspect daily for signs of infection.     No tub soaking, bathing or swimming until healed.      What to Expect     You are likely to feel tired and sore for 1-2 weeks     Normal sensation to the breast cannot be restored; in time, some feeling may return.     You may feel muscle spasms in the chest.    Appearance     Most scars will fade substantially over time, 1-2 years.     Reconstructed breast(s) may feel firmer and look rounder or flatter that the natural           Breast.     Reconstructed breast may not match the natural  Pt to CT with RN on monitor with RRT      Kelly Solorio, FORTINO  06/25/21 4140 breast.          Please note my office will call you 1-2 business days after your procedure to check up on how you're doing and to schedule your post-operative appointment.     When to Call     If you have increased swelling or bruising.     If swelling and redness persist after a few days.     If you have increased redness along the incision.     If you have severe or increased pain not relieved by medication.     If you have any side effects to medications; such as, rash, nausea,      headache, vomiting.     If you have an oral temperature over 100.4 degrees.     If you have any yellowish or greenish drainage from the incisions or     notice a foul odor.     If you have bleeding from the incisions that is difficult to control with      light pressure.     If you have loss of feeling or motion.    For Medical Questions, Please Call:     [phone], Monday - Friday, 8 a.m. - 4:30 p.m.     After hours and on weekends, please call Hospital Paging at [phone] and ask       for the Plastic Surgeon on call.    [facility]  Home Care Following Anesthesia  For 24 hours after surgery:  1. Get plenty of rest.  A responsible adult must stay with you for at least 24 hours after you leave the surgery center.  2. Do not drive or use heavy equipment.  If you have weakness or tingling, don't drive or use heavy equipment until this feeling goes away.   3. Do not drink alcohol.   4. Avoid strenuous or risky activities.  Ask for help when climbing stairs.  5. You may feel lightheaded.  IF so, sit for a few minutes before standing.  Have someone help you get up.   6. If you have nausea (feel sick to your stomach): Drink only clear liquids such as apple juice, ginger ale, broth or 7-Up.  Rest may also help.  Be sure to drink enough fluids.  Move to a regular diet as you feel able.   7. You may have a slight fever.  Call the doctor if your fever is over 100 F (37.7 C) (taken under the tongue)  or lasts longer than 24 hours.  8. You may have a dry mouth, a sore throat, muscle aches or trouble sleeping. These should go away after 24 hours.  9. Do not make important or legal decisions.   10. It is recommended to avoid smoking.               Tips for taking pain medications  To get the best pain relief possible, remember these points:    Take pain medications as directed, before pain becomes severe.    Pain medication can upset your stomach: taking it with food may help.    Constipation is a common side effect of pain medication. Drink plenty of  fluids.    Eat foods high in fiber. Take a stool softener if recommended by your doctor or pharmacist.    Do not drink alcohol, drive or operate machinery while taking pain medications.    Ask about other ways to control pain, such as with heat, ice or relaxation.    Tylenol/Acetaminophen Consumption  To help encourage the safe use of acetaminophen, the makers of TYLENOL  have lowered the maximum daily dose for single-ingredient Extra Strength TYLENOL  (acetaminophen) products sold in the [place] from 8 pills per day (4,000 mg) to 6 pills per day (3,000 mg). The dosing interval has also changed from 2 pills every 4-6 hours to 2 pills every 6 hours.    If you feel your pain relief is insufficient, you may take Tylenol/Acetaminophen in addition to your narcotic pain medication.     Be careful not to exceed 3,000 mg of Tylenol/Acetaminophen in a 24 hour period from all sources.    If you are taking extra strength Tylenol/acetaminophen (500 mg), the maximum dose is 6 tablets in 24 hours.    If you are taking regular strength acetaminophen (325 mg), the maximum dose is 9 tablets in 24 hours.    Call a doctor for any of the followin. Signs of infection (fever, growing tenderness at the surgery site, a large amount of drainage or bleeding, severe pain, foul-smelling drainage, redness, swelling).  2. It has been over 8 to 10 hours since surgery and you are still not able  to urinate (pass water).  3. Headache for over 24 hours.  4. Numbness, tingling or weakness the day after surgery (if you had spinal anesthesia).  5. Signs of Covid-19 infection (temperature over 100 degrees, shortness of breath, cough, loss of taste/smell, generalized body aches, persistent headache, chills, sore throat, nausea/vomiting/diarrhea)  Your doctor is:  Dr. Bismark Miner, Plastic Surgery: [phone]                    Or dial [phone] and ask for the resident on call for:  [facility]  For emergency care, call the:  [place] [facility]:  [phone] (TTY for hearing impaired: [phone])

## 2021-10-16 ENCOUNTER — HOSPITAL ENCOUNTER (EMERGENCY)
Age: 46
Discharge: HOME | End: 2021-10-16
Attending: EMERGENCY MEDICINE
Payer: COMMERCIAL

## 2021-10-16 ENCOUNTER — APPOINTMENT (OUTPATIENT)
Dept: CT IMAGING | Age: 46
End: 2021-10-16

## 2021-10-16 ENCOUNTER — APPOINTMENT (OUTPATIENT)
Dept: GENERAL RADIOLOGY | Age: 46
End: 2021-10-16

## 2021-10-16 VITALS
DIASTOLIC BLOOD PRESSURE: 80 MMHG | SYSTOLIC BLOOD PRESSURE: 131 MMHG | WEIGHT: 315 LBS | TEMPERATURE: 98.2 F | BODY MASS INDEX: 44.1 KG/M2 | RESPIRATION RATE: 16 BRPM | OXYGEN SATURATION: 96 % | HEIGHT: 71 IN | HEART RATE: 94 BPM

## 2021-10-16 DIAGNOSIS — T40.601A OPIATE OVERDOSE, ACCIDENTAL OR UNINTENTIONAL, INITIAL ENCOUNTER (HCC): Primary | ICD-10-CM

## 2021-10-16 LAB
A/G RATIO: 1.3 (ref 1.1–2.2)
ALBUMIN SERPL-MCNC: 4.3 G/DL (ref 3.4–5)
ALP BLD-CCNC: 69 U/L (ref 40–129)
ALT SERPL-CCNC: 44 U/L (ref 10–40)
ANION GAP SERPL CALCULATED.3IONS-SCNC: 13 MMOL/L (ref 3–16)
ANION GAP SERPL CALCULATED.3IONS-SCNC: 7 MMOL/L (ref 3–16)
AST SERPL-CCNC: 51 U/L (ref 15–37)
BASOPHILS ABSOLUTE: 0.1 K/UL (ref 0–0.2)
BASOPHILS ABSOLUTE: 0.2 K/UL (ref 0–0.2)
BASOPHILS RELATIVE PERCENT: 0.4 %
BASOPHILS RELATIVE PERCENT: 0.7 %
BILIRUB SERPL-MCNC: 0.3 MG/DL (ref 0–1)
BUN BLDV-MCNC: 16 MG/DL (ref 7–20)
BUN BLDV-MCNC: 16 MG/DL (ref 7–20)
CALCIUM SERPL-MCNC: 8.4 MG/DL (ref 8.3–10.6)
CALCIUM SERPL-MCNC: 9.2 MG/DL (ref 8.3–10.6)
CHLORIDE BLD-SCNC: 107 MMOL/L (ref 99–110)
CHLORIDE BLD-SCNC: 110 MMOL/L (ref 99–110)
CO2: 23 MMOL/L (ref 21–32)
CO2: 26 MMOL/L (ref 21–32)
CREAT SERPL-MCNC: 1.2 MG/DL (ref 0.9–1.3)
CREAT SERPL-MCNC: 1.5 MG/DL (ref 0.9–1.3)
EOSINOPHILS ABSOLUTE: 0 K/UL (ref 0–0.6)
EOSINOPHILS ABSOLUTE: 0.1 K/UL (ref 0–0.6)
EOSINOPHILS RELATIVE PERCENT: 0 %
EOSINOPHILS RELATIVE PERCENT: 0.2 %
GFR AFRICAN AMERICAN: >60
GFR AFRICAN AMERICAN: >60
GFR NON-AFRICAN AMERICAN: 50
GFR NON-AFRICAN AMERICAN: >60
GLOBULIN: 3.2 G/DL
GLUCOSE BLD-MCNC: 185 MG/DL (ref 70–99)
GLUCOSE BLD-MCNC: 68 MG/DL (ref 70–99)
HCT VFR BLD CALC: 38.3 % (ref 40.5–52.5)
HCT VFR BLD CALC: 40.2 % (ref 40.5–52.5)
HEMOGLOBIN: 11.3 G/DL (ref 13.5–17.5)
HEMOGLOBIN: 12 G/DL (ref 13.5–17.5)
LYMPHOCYTES ABSOLUTE: 1 K/UL (ref 1–5.1)
LYMPHOCYTES ABSOLUTE: 1.2 K/UL (ref 1–5.1)
LYMPHOCYTES RELATIVE PERCENT: 4.6 %
LYMPHOCYTES RELATIVE PERCENT: 6.6 %
MCH RBC QN AUTO: 17.5 PG (ref 26–34)
MCH RBC QN AUTO: 17.7 PG (ref 26–34)
MCHC RBC AUTO-ENTMCNC: 29.6 G/DL (ref 31–36)
MCHC RBC AUTO-ENTMCNC: 29.8 G/DL (ref 31–36)
MCV RBC AUTO: 58.7 FL (ref 80–100)
MCV RBC AUTO: 59.7 FL (ref 80–100)
MONOCYTES ABSOLUTE: 0.9 K/UL (ref 0–1.3)
MONOCYTES ABSOLUTE: 1.6 K/UL (ref 0–1.3)
MONOCYTES RELATIVE PERCENT: 4.2 %
MONOCYTES RELATIVE PERCENT: 9.2 %
NEUTROPHILS ABSOLUTE: 15 K/UL (ref 1.7–7.7)
NEUTROPHILS ABSOLUTE: 19.5 K/UL (ref 1.7–7.7)
NEUTROPHILS RELATIVE PERCENT: 83.8 %
NEUTROPHILS RELATIVE PERCENT: 90.3 %
PDW BLD-RTO: 18.7 % (ref 12.4–15.4)
PDW BLD-RTO: 18.9 % (ref 12.4–15.4)
PLATELET # BLD: 421 K/UL (ref 135–450)
PLATELET # BLD: 486 K/UL (ref 135–450)
PMV BLD AUTO: 8.5 FL (ref 5–10.5)
PMV BLD AUTO: 8.6 FL (ref 5–10.5)
POTASSIUM REFLEX MAGNESIUM: 4.6 MMOL/L (ref 3.5–5.1)
POTASSIUM SERPL-SCNC: 4.3 MMOL/L (ref 3.5–5.1)
RBC # BLD: 6.41 M/UL (ref 4.2–5.9)
RBC # BLD: 6.86 M/UL (ref 4.2–5.9)
SODIUM BLD-SCNC: 143 MMOL/L (ref 136–145)
SODIUM BLD-SCNC: 143 MMOL/L (ref 136–145)
TOTAL PROTEIN: 7.5 G/DL (ref 6.4–8.2)
TROPONIN: <0.01 NG/ML
WBC # BLD: 17.8 K/UL (ref 4–11)
WBC # BLD: 21.6 K/UL (ref 4–11)

## 2021-10-16 PROCEDURE — 99284 EMERGENCY DEPT VISIT MOD MDM: CPT

## 2021-10-16 PROCEDURE — 93005 ELECTROCARDIOGRAM TRACING: CPT | Performed by: PHYSICIAN ASSISTANT

## 2021-10-16 PROCEDURE — 84484 ASSAY OF TROPONIN QUANT: CPT

## 2021-10-16 PROCEDURE — 71045 X-RAY EXAM CHEST 1 VIEW: CPT

## 2021-10-16 PROCEDURE — 70486 CT MAXILLOFACIAL W/O DYE: CPT

## 2021-10-16 PROCEDURE — 80053 COMPREHEN METABOLIC PANEL: CPT

## 2021-10-16 PROCEDURE — 70450 CT HEAD/BRAIN W/O DYE: CPT

## 2021-10-16 PROCEDURE — 96376 TX/PRO/DX INJ SAME DRUG ADON: CPT

## 2021-10-16 PROCEDURE — 36415 COLL VENOUS BLD VENIPUNCTURE: CPT

## 2021-10-16 PROCEDURE — 96374 THER/PROPH/DIAG INJ IV PUSH: CPT

## 2021-10-16 PROCEDURE — 2580000003 HC RX 258: Performed by: PHYSICIAN ASSISTANT

## 2021-10-16 PROCEDURE — 72125 CT NECK SPINE W/O DYE: CPT

## 2021-10-16 PROCEDURE — 6360000002 HC RX W HCPCS: Performed by: PHYSICIAN ASSISTANT

## 2021-10-16 PROCEDURE — 85025 COMPLETE CBC W/AUTO DIFF WBC: CPT

## 2021-10-16 RX ORDER — ONDANSETRON 2 MG/ML
4 INJECTION INTRAMUSCULAR; INTRAVENOUS ONCE
Status: COMPLETED | OUTPATIENT
Start: 2021-10-16 | End: 2021-10-16

## 2021-10-16 RX ORDER — 0.9 % SODIUM CHLORIDE 0.9 %
1000 INTRAVENOUS SOLUTION INTRAVENOUS ONCE
Status: COMPLETED | OUTPATIENT
Start: 2021-10-16 | End: 2021-10-16

## 2021-10-16 RX ADMIN — ONDANSETRON 4 MG: 2 INJECTION INTRAMUSCULAR; INTRAVENOUS at 16:45

## 2021-10-16 RX ADMIN — SODIUM CHLORIDE 1000 ML: 9 INJECTION, SOLUTION INTRAVENOUS at 15:33

## 2021-10-16 RX ADMIN — SODIUM CHLORIDE 1000 ML: 9 INJECTION, SOLUTION INTRAVENOUS at 16:44

## 2021-10-16 RX ADMIN — ONDANSETRON 4 MG: 2 INJECTION INTRAMUSCULAR; INTRAVENOUS at 15:34

## 2021-10-16 ASSESSMENT — ENCOUNTER SYMPTOMS
COUGH: 0
NAUSEA: 0
VOMITING: 0
CHEST TIGHTNESS: 0
SHORTNESS OF BREATH: 0

## 2021-10-16 NOTE — ED PROVIDER NOTES
[facility] ENCOUNTER        Pt Name: Roxane Zavala  MRN: [id number]  [place] 1975  Date of evaluation: 10/16/2021  Provider: VANDANA Mcfadden  PCP: Stephan Gilbert  Note Started: 2:41 PM EDT        I have seen and evaluated this patient with my supervising physician Costa Bass, [facility]       Chief Complaint   Patient presents with    Drug Overdose     Patient was found unconscious at home, given Narcan and became alert with vomiting. Patient with illicit drug use hx but has been successfully sober for a while. However, daughter who was on scene found \"sampler fentanyl packs\" that were used. Patient complaints of ringing in his ears that he did not have prior to becoming conscious again. Denies other pain.  Loss of Consciousness       HISTORY OF PRESENT ILLNESS   (Location, Timing/Onset, Context/Setting, Quality, Duration, Modifying Factors, Severity, Associated Signs and Symptoms)  Note limiting factors. Chief Complaint: Drug overdose     Roxane Zavala is a 51 y.o. male with past medical history including polysubstance abuse, obesity presents to the emergency department today via EMS with complaints of a drug overdose. The patient states he cannot recall what he took. He states that to his knowledge he has been clean since he left drug rehab in [place] [address]. He was helping his dad do some woodworking today, and took his dog for a walk, and cannot recall what happened after that. He states that he believes he was in his chair, he does not have any injuries anywhere, he does not hurt anywhere at this time. He has no further complaints. Nursing Notes were all reviewed and agreed with or any disagreements were addressed in the HPI. REVIEW OF SYSTEMS    (2-9 systems for level 4, 10 or more for level 5)     Review of Systems   Constitutional: Negative for chills and fever.    Respiratory: Negative for cough, chest tightness and shortness of breath. Cardiovascular: Negative for chest pain and palpitations. Gastrointestinal: Negative for nausea and vomiting. Musculoskeletal: Negative for arthralgias and gait problem. Neurological: Negative for dizziness, weakness, numbness and headaches. Positives and Pertinent negatives as per HPI. Except as noted above in the ROS, all other systems were reviewed and negative. PAST MEDICAL HISTORY     Past Medical History:   Diagnosis Date    Drug use 2020    Hypertension     Super obesity          SURGICAL HISTORY   History reviewed. No pertinent surgical history. CURRENTMEDICATIONS       Previous Medications    METHADONE (DOLOPHINE) 10 MG/ML SOLUTION    Take 80 mg by mouth daily. TRIAMTERENE-HYDROCHLOROTHIAZIDE (MAXZIDE-25) 37.5-25 MG PER TABLET    Take 1 tablet by mouth daily         ALLERGIES     Patient has no known allergies. FAMILYHISTORY     History reviewed. No pertinent family history. SOCIAL HISTORY       Social History     Tobacco Use    Smoking status: Never Smoker    Smokeless tobacco: Never Used   Vaping Use    Vaping Use: Never used   Substance Use Topics    Alcohol use: Not Currently    Drug use: Not Currently     Types: Opiates , Other-see comments     Comment: Polysubstance use, OD 6-2021       SCREENINGS             PHYSICAL EXAM    (up to 7 for level 4, 8 or more for level 5)     ED Triage Vitals [10/16/21 1438]   BP Temp Temp Source Pulse Resp SpO2 Height Weight   126/84 97.6 °F (36.4 °C) Oral 115 15 97 % 5' 11\" (1.803 m) (!) 336 lb 13.8 oz (152.8 kg)       Physical Exam  Vitals and nursing note reviewed. Constitutional:       General: He is not in acute distress. Appearance: He is well-developed. He is obese. He is not ill-appearing, toxic-appearing or diaphoretic. HENT:      Head: Normocephalic and atraumatic.       Right Ear: Tympanic membrane and ear canal normal.      Left Ear: Tympanic membrane and ear canal normal. Mouth/Throat:      Mouth: Mucous membranes are moist.      Pharynx: Oropharynx is clear. Eyes:      Extraocular Movements: Extraocular movements intact. Conjunctiva/sclera: Conjunctivae normal.      Pupils: Pupils are equal, round, and reactive to light. Cardiovascular:      Rate and Rhythm: Regular rhythm. Tachycardia present. Pulmonary:      Effort: Pulmonary effort is normal. No respiratory distress. Breath sounds: Normal breath sounds. Abdominal:      General: Bowel sounds are normal. There is no distension. Tenderness: There is no abdominal tenderness. There is no guarding or rebound. Musculoskeletal:         General: Normal range of motion. Cervical back: Normal range of motion and neck supple. Skin:     General: Skin is warm and dry. Neurological:      General: No focal deficit present. Mental Status: He is alert and oriented to person, place, and time. Cranial Nerves: No cranial nerve deficit. Psychiatric:         Behavior: Behavior normal. Behavior is cooperative. Thought Content:  Thought content normal.         DIAGNOSTIC RESULTS   LABS:    Labs Reviewed   CBC WITH AUTO DIFFERENTIAL - Abnormal; Notable for the following components:       Result Value    WBC 21.6 (*)     RBC 6.86 (*)     Hemoglobin 12.0 (*)     Hematocrit 40.2 (*)     MCV 58.7 (*)     MCH 17.5 (*)     MCHC 29.8 (*)     RDW 18.9 (*)     Platelets 216 (*)     Neutrophils Absolute 19.5 (*)     All other components within normal limits    Narrative:     Performed at:  [facility]  [address] [facility]   Phone [phone]   COMPREHENSIVE METABOLIC PANEL W/ REFLEX TO MG FOR LOW K - Abnormal; Notable for the following components:    Glucose 185 (*)     CREATININE 1.5 (*)     GFR Non- 50 (*)     ALT 44 (*)     AST 51 (*)     All other components within normal limits    Narrative:     Performed at:  [facility]) - [facility]  [address] [place], [facility]   Phone [phone]   BASIC METABOLIC PANEL - Abnormal; Notable for the following components:    Glucose 68 (*)     All other components within normal limits    Narrative:     Performed at:  [facility]  [address] [place], [facility]   Phone [phone]   CBC WITH AUTO DIFFERENTIAL - Abnormal; Notable for the following components:    WBC 17.8 (*)     RBC 6.41 (*)     Hemoglobin 11.3 (*)     Hematocrit 38.3 (*)     MCV 59.7 (*)     MCH 17.7 (*)     MCHC 29.6 (*)     RDW 18.7 (*)     Neutrophils Absolute 15.0 (*)     Monocytes Absolute 1.6 (*)     All other components within normal limits    Narrative:     Performed at:  [facility]  [address] [place], [facility]   Phone [phone]   TROPONIN    Narrative:     Performed at:  [address] [facility]  [address] [place], [facility]   Phone [phone]       When ordered only abnormal lab results are displayed. All other labs were within normal range or not returned as of this dictation. EKG: When ordered, EKG's are interpreted by the Emergency Department Physician in the absence of a cardiologist.  Please see their note for interpretation of EKG. RADIOLOGY:   Non-plain film images such as CT, Ultrasound and MRI are read by the radiologist. Plain radiographic images are visualized and preliminarily interpreted by the ED Provider with the below findings:        Interpretation per the Radiologist below, if available at the time of this note:    XR CHEST PORTABLE   Final Result   No radiographic evidence of acute pulmonary disease. CT HEAD WO CONTRAST   Final Result   No acute intracranial abnormality. CT FACIAL BONES WO CONTRAST   Final Result   No acute maxillofacial abnormality.          CT CERVICAL SPINE WO CONTRAST   Final Result   No acute abnormality of the cervical spine. No results found. PROCEDURES   Unless otherwise noted below, none     Procedures    CRITICAL CARE TIME   N/A    CONSULTS:  None      EMERGENCY DEPARTMENT COURSE and DIFFERENTIAL DIAGNOSIS/MDM:   Vitals:    Vitals:    10/16/21 1529 10/16/21 1530 10/16/21 1552 10/16/21 1644   BP: (!) 147/122 139/82  131/80   Pulse: 107 127 95 94   Resp: 28 14 19 16   Temp:  98 °F (36.7 °C)  98.2 °F (36.8 °C)   TempSrc:       SpO2: 90% 96% 93% 96%   Weight:       Height:           Patient was given the following medications:  Medications   0.9 % sodium chloride bolus (0 mLs IntraVENous Stopped 10/16/21 1553)   ondansetron (ZOFRAN) injection 4 mg (4 mg IntraVENous Given 10/16/21 1534)   0.9 % sodium chloride bolus (0 mLs IntraVENous Stopped 10/16/21 1702)   ondansetron (ZOFRAN) injection 4 mg (4 mg IntraVENous Given 10/16/21 1645)           ED COURSE & MEDICAL DECISION MAKING    - The patient presented to the ER with complaints of overdose. Vital signs were reviewed. Exam as detailed above. Peripheral IV placed. Labs, Imaging ordered. - Pertinent Labs & Imaging studies reviewed. (See chart for details)   -  Patient seen and evaluated in the emergency department. -  Triage and nursing notes reviewed and incorporated. -  Old chart records reviewed and incorporated. -   I have seen and evaluated this patient with my supervising physician Elda Ohara DO.  -  Differential diagnosis includes:  infection/sepsis, medication side effect, intoxication/withdrawal, metabolic/electrolyte abnormalities  -  Work-up included:  See above  -  ED treatment included:  EMS gave Narcan 2mg IM prior arrival. In the ED he was tx with IV fluids, zofran. He tolerated a PO challenge without difficulty. -  Results discussed with patient and/or family. Labs show white blood cell count of 21.6, no concerning anemia.   Metabolic panel with creatinine 1.5, GFR 50, ALT 44, DISPOSITION/PLAN   DISPOSITION        PATIENT REFERRED TO:  [address] [facility] [address]  [phone]    Schedule an appointment as soon as possible for a visit       [address]  [place] [address]  [phone]    If symptoms worsen    [facility]  [place] [address], [address] [facility],[address]  [phone]  Call today        DISCHARGE MEDICATIONS:  New Prescriptions    No medications on file       DISCONTINUED MEDICATIONS:  Discontinued Medications    No medications on file              (Please note that portions of this note were completed with a voice recognition program.  Efforts were made to edit the dictations but occasionally words are mis-transcribed.)    VANDANA Rodgers (electronically signed)            Minerva Rodgers  10/16/21 8696

## 2021-10-16 NOTE — ED PROVIDER NOTES
[address]39 [facility] ENCOUNTER            Attending Note:    Patient was seen and examined by the physician assistant. I also completed a face-to-face examination. Pt Name: Maldonado Middleton  MRN: [id number]  [place] 1975  Date of evaluation: 10/16/2021  Provider: Aziza Aivla, [facility]9 [facility]       Chief Complaint   Patient presents with    Drug Overdose     Patient was found unconscious at home, given Narcan and became alert with vomiting. Patient with illicit drug use hx but has been successfully sober for a while. However, daughter who was on scene found \"sampler fentanyl packs\" that were used. Patient complaints of ringing in his ears that he did not have prior to becoming conscious again. Denies other pain.  Loss of Consciousness         HISTORY OF PRESENT ILLNESS   (Location/Symptom, Timing/Onset, Context/Setting, Quality, Duration, Modifying Factors, Severity)  Note limiting factors. Maldonado Middleton is a 51 y.o. male who presents to the emergency department with a complaint of possible drug overdose. Patient has a history of opiate abuse and prior overdose in the past.  He was found unresponsive by his father at home sitting in a chair. Patient fell forward onto his head on the floor. They attempted to get him up but then he fell again and struck his head. His father called 46. Paramedics arrived and administered Narcan 2 mg IM and immediately awakened. He complains of some nausea on arrival.  Also complains of \"ringing in his ears\". He describes this as a high-pitched roaring sound. He denies any prior history of tinnitus, hearing loss, or ear pain. He denies any neck pain chest pain or shortness of breath. He denies any abdominal pain vomiting or diarrhea. No recent illness. When asked if he used any opiates prior to arrival, he states \"I do not remember\".   His daughter is at the bedside and reports that he was unresponsive Activity    Alcohol use: Not Currently    Drug use: Not Currently     Types: Opiates , Other-see comments     Comment: Polysubstance use, OD 6-2021    Sexual activity: Not Currently   Other Topics Concern    None   Social History Narrative    None     Social Determinants of Health     Financial Resource Strain:     Difficulty of Paying Living Expenses:    Food Insecurity:     Worried About Running Out of Food in the Last Year:     Ran Out of Food in the Last Year:    Transportation Needs:     Lack of Transportation (Medical):  Lack of Transportation (Non-Medical):    Physical Activity:     Days of Exercise per Week:     Minutes of Exercise per Session:    Stress:     Feeling of Stress :    Social Connections:     Frequency of Communication with Friends and Family:     Frequency of Social Gatherings with Friends and Family:     Attends Rastafari Services:     Active Member of Clubs or Organizations:     Attends Club or Organization Meetings:     Marital Status:    Intimate Partner Violence:     Fear of Current or Ex-Partner:     Emotionally Abused:     Physically Abused:     Sexually Abused:        SCREENINGS             PHYSICAL EXAM    (up to 7 for level 4, 8 or more for level 5)     ED Triage Vitals [10/16/21 1438]   BP Temp Temp Source Pulse Resp SpO2 Height Weight   126/84 97.6 °F (36.4 °C) Oral 115 15 97 % 5' 11\" (1.803 m) (!) 336 lb 13.8 oz (152.8 kg)         Physical Exam   Constitutional: Awake and alert. Nauseated. Cooperative. Head: No visible evidence of trauma. Normocephalic. Eyes: Pupils equal and reactive. No photophobia. Conjunctiva normal.    HENT: Oral mucosa moist.  Airway patent. Pharynx without erythema. Small amount of dried dark red blood in the floor of the right nasal vestibule. No septal hematoma. No bony mid facial tenderness. Mandible nontender reformed to motion. No malocclusion. No other intraoral or intranasal injury. Neck:  Soft and supple. Nontender. No point or axial tenderness. Pain with range of motion. Heart:  Regular rate and rhythm. No murmur. Lungs:  Clear to auscultation. No wheezes, rales, or ronchi. No conversational dyspnea or accessory muscle use. Chest: Chest wall non-tender. No evidence of trauma. Abdomen:  Soft, nondistended, bowel sounds present. Nontender. No guarding rigidity or rebound. No masses. Unable to elicit any abdominal discomfort to palpation. Musculoskeletal: Extremities non-tender with full range of motion. Radial and dorsalis pedis pulses were intact. No calf tenderness erythema or edema. Neurological: Alert and oriented x 3.  CS 15. No dysarthria. No aphasia. No pronator drift. Speech clear. Cranial nerves II-XII intact. No facial droop. No acute focal motor or sensory deficits. Skin: Skin is warm and dry. No rash. Lymphatic:  No lympadenopathy. Psychiatric: Normal mood and affect. Behavior is normal.         DIAGNOSTIC RESULTS     EKG: All EKG's are interpreted by the Emergency Department Physician who either signs or Co-signs this chart in the absence of a cardiologist.    Sinus tachycardia. Rate 102. TN interval 140 ms. QRS duration 88 ms. QTc 466 ms.  R axis VIII degrees. No ST elevation. Nonspecific T wave abnormalities. RADIOLOGY:   Non-plain film images such as CT, Ultrasound and MRI are read by the radiologist. Plain radiographic images are visualized and preliminarily interpreted by the emergency physician with the below findings:        Interpretation per the Radiologist below, if available at the time of this note:    XR CHEST PORTABLE   Final Result   No radiographic evidence of acute pulmonary disease. CT HEAD WO CONTRAST   Final Result   No acute intracranial abnormality. CT FACIAL BONES WO CONTRAST   Final Result   No acute maxillofacial abnormality. CT CERVICAL SPINE WO CONTRAST   Final Result   No acute abnormality of the cervical spine. ED BEDSIDE ULTRASOUND:   Performed by ED Physician - none    LABS:  Labs Reviewed   CBC WITH AUTO DIFFERENTIAL - Abnormal; Notable for the following components:       Result Value    WBC 21.6 (*)     RBC 6.86 (*)     Hemoglobin 12.0 (*)     Hematocrit 40.2 (*)     MCV 58.7 (*)     MCH 17.5 (*)     MCHC 29.8 (*)     RDW 18.9 (*)     Platelets 556 (*)     Neutrophils Absolute 19.5 (*)     All other components within normal limits    Narrative:     Performed at:  [facility]  [address] [place], [facility]   Phone [phone]   COMPREHENSIVE METABOLIC PANEL W/ REFLEX TO MG FOR LOW K - Abnormal; Notable for the following components:    Glucose 185 (*)     CREATININE 1.5 (*)     GFR Non- 50 (*)     ALT 44 (*)     AST 51 (*)     All other components within normal limits    Narrative:     Performed at:  [facility]  [address] [place], [facility]   Phone [phone]   BASIC METABOLIC PANEL - Abnormal; Notable for the following components:    Glucose 68 (*)     All other components within normal limits    Narrative:     Performed at:  [facility]  [address] [place], [facility]   Phone [phone]   CBC WITH AUTO DIFFERENTIAL - Abnormal; Notable for the following components:    WBC 17.8 (*)     RBC 6.41 (*)     Hemoglobin 11.3 (*)     Hematocrit 38.3 (*)     MCV 59.7 (*)     MCH 17.7 (*)     MCHC 29.6 (*)     RDW 18.7 (*)     Neutrophils Absolute 15.0 (*)     Monocytes Absolute 1.6 (*)     All other components within normal limits    Narrative:     Performed at:  [facility]  [address] [place], [facility]   Phone [phone]   TROPONIN    Narrative:     Performed at:  [address] [facility]  [address] [place], [facility]   Phone [phone]       All other labs were within normal range or not returned as of this dictation. EMERGENCY DEPARTMENT COURSE and DIFFERENTIAL DIAGNOSIS/MDM:   Vitals:    Vitals:    10/16/21 1529 10/16/21 1530 10/16/21 1552 10/16/21 1644   BP: (!) 147/122 139/82  131/80   Pulse: 107 127 95 94   Resp: 28 14 19 16   Temp:  98 °F (36.7 °C)  98.2 °F (36.8 °C)   TempSrc:       SpO2: 90% 96% 93% 96%   Weight:       Height:             MDM      The patient presents with a suspected opiate overdose. He was found unresponsive in a chair by his father and fell out of the chair and struck his head when they were attempting to arouse him. He did immediately awaken with Narcan 2 mg IM. Prior records indicate that he was on methadone in the past but he has been off of this since June or July. Mentation is consistent with an opiate overdose. He was initially tachycardic with a heart rate of 115. He was given Zofran 4 mg IV for nausea. Given the fact that he struck his head twice, CT head, cervical spine and maxillofacial was obtained. REASSESSMENT          6:17 PM: White blood cell count is elevated at 21.6. CBC will be repeated as well as BMP. This may be secondary to demargination from his overdose. He is afebrile. He is hemodynamically stable. No evidence of hypotension or tachycardia. Heart rate is currently 91. Oxygen saturation is 97% on room air. No evidence of tachypnea. Chest x-ray is negative. No evidence of aspiration. Troponin is normal.      6:45 PM: The patient admits that he did a line of fentanyl prior to the overdose. He denies ingestion of any other medications chemicals or substances. He does not currently use on a daily basis. He is frustrated knowing that he just finished rehab inpatient treatment in [place] at the end of August.  He will be given a referral to [facility] addiction center. He denies any suicidal or homicidal ideations. No auditory or visual hallucinations.   He is stable for discharge. Advised him to drink plenty of fluids. Advised him to follow-up with a primary care physician in 1 to 2 days for reexamination. If his condition worsens or new symptoms develop, he was advised to return immediately to the emergency department. CRITICAL CARE TIME   Total Critical Care time was 0 minutes, excluding separately reportable procedures. There was a high probability of clinically significant/life threatening deterioration in the patient's condition which required my urgent intervention. CONSULTS:  None    PROCEDURES:  Unless otherwise noted below, none     Procedures        FINAL IMPRESSION      1. Opiate overdose, accidental or unintentional, initial encounter [facility])          DISPOSITION/PLAN   DISPOSITION        PATIENT REFERRED TO:  [address] [place] [address]  [phone]    Schedule an appointment as soon as possible for a visit       [address] [facility]  [place] [address] [phone]    If symptoms worsen      DISCHARGE MEDICATIONS:  New Prescriptions    No medications on file     Controlled Substances Monitoring:     No flowsheet data found. (Please note that portions of this note were completed with a voice recognition program.  Efforts were made to edit the dictations but occasionally words are mis-transcribed. )    [address] Evar[address] Mancera DO (electronically signed)  Attending Emergency Physician          Doe Valencia DO  10/16/21 2162

## 2021-10-16 NOTE — ED TRIAGE NOTES
Patient brought in by [address] [place] [address] EMS was found unconscious at home, given Narcan and became alert with vomiting. Patient with illicit drug use hx but has been successfully sober for a while. However, daughter who was on scene found \"sampler fentanyl packs\" that were used. Patient complaints of ringing in his ears that he did not have prior to becoming conscious again. Denies other pain. Patient is dozing off at bedside, pupils pinpoint, right nare epistaxis.

## 2021-10-17 LAB
EKG ATRIAL RATE: 102 BPM
EKG DIAGNOSIS: NORMAL
EKG P AXIS: 51 DEGREES
EKG P-R INTERVAL: 140 MS
EKG Q-T INTERVAL: 358 MS
EKG QRS DURATION: 88 MS
EKG QTC CALCULATION (BAZETT): 466 MS
EKG R AXIS: 8 DEGREES
EKG T AXIS: 40 DEGREES
EKG VENTRICULAR RATE: 102 BPM

## 2021-10-17 PROCEDURE — 93010 ELECTROCARDIOGRAM REPORT: CPT | Performed by: INTERNAL MEDICINE

## 2024-05-10 NOTE — PROGRESS NOTES
[facility]  [address] [facility] [address]  Phone: [phone]  Fax: [phone]     PREVENTION OF BACTERIAL ENDOCARDITIS (selective IE)    A COPY OF THIS SHEET MUST BE GIVEN TO ALL OF YOUR DOCTORS OR HEALTH CARE PROVIDERS    You have received this information because you are at an increased risk for developing adverse outcomes from infective endocarditis (IE), also known as subacute bacterial endocarditis (SBE).    Patient Name:  Muna Goldman    : 2016   Diagnosis: Mitral regurgitation    As of 5/10/2024, Samson Mora MD, Pediatric Cardiologist recommends that Muna receive SELECTIVE USE of antibiotic prophylaxis from bacterial endocarditis.    Antibiotic prophylaxis with dental or surgical procedures is recommended in selected instances if your dentist, surgeon or physician believes there is a greater risk of infection.  For example:  1) Any significantly infected operative field (Example: dental abscess or ruptured appendix) which may increase the bacterial load to the blood stream during the procedure; 2) Benefits of antibiotic coverage should be weighed against risk of allergic reactions and anaphylaxis; therefore, their use should be carefully selected based on individual cases.     Antibiotic prophylaxis is NOT recommended for the following dental procedures or events: routine anesthetic injections through non-infected tissue; taking dental radiographs; placement of removable prosthodontic or orthodontic appliances; adjustment of orthodontic appliances; placement of orthodontic brackets; and shedding of deciduous teeth or bleeding from trauma to the lips or oral mucosa.   If recommended by the Health Care Provider - Antibiotic Prophylactic Regimens   Regimen - Single Dose 30-60 minutes before Procedure  Situation Agent Adults Children   Oral Amoxicillin 2g 50/mg/kg   Unable to take oral meds Ampicillin   OR  Cefazolin or ceftriaxone 2 g IM or IV1    1 g  Hospitalist Progress Note      PCP: No primary care provider on file. Date of Admission: 6/25/2021    Chief Complaint: Encephalopathy    Hospital Course: 58-year-old male with past history of polysubstance abuse, hypertension with noncompliance of medication presented to the hospital altered from home. Patient needed to be intubated due to his encephalopathy    Subjective: No acute events reported overnight. Remains sedated and intubated in the ICU      Medications:  Reviewed    Infusion Medications    dextrose 100 mL/hr (06/26/21 0747)    fentaNYL Stopped (06/26/21 1020)    dexmedetomidine 0.5 mcg/kg/hr (06/26/21 1353)    propofol 35 mcg/kg/min (06/26/21 1430)    sodium chloride       Scheduled Medications    chlorhexidine  15 mL Mouth/Throat BID    famotidine (PEPCID) injection  20 mg Intravenous BID    enoxaparin  40 mg Subcutaneous BID    methadone  80 mg Oral Daily    sodium chloride flush  5-40 mL Intravenous 2 times per day     PRN Meds: glucose, dextrose, glucagon (rDNA), dextrose, fentanNYL, midazolam, ondansetron **OR** ondansetron, polyethylene glycol, acetaminophen **OR** acetaminophen, sodium chloride flush, sodium chloride      Intake/Output Summary (Last 24 hours) at 6/26/2021 1542  Last data filed at 6/26/2021 1141  Gross per 24 hour   Intake 490.8 ml   Output 2810 ml   Net -2319.2 ml       Physical Exam Performed:    /69   Pulse 67   Temp 98.4 °F (36.9 °C) (Axillary)   Resp 16   Ht 5' 10\" (1.778 m) Comment: per care everywhere  Wt (!) 375 lb 10.6 oz (170.4 kg)   SpO2 92%   BMI 53.90 kg/m²     General appearance: No apparent distress, appears stated age and cooperative. HEENT: Pupils equal, round, and reactive to light. Conjunctivae/corneas clear. Neck: Supple, with full range of motion. No jugular venous distention. Trachea midline. Respiratory:  Normal respiratory effort. Clear to auscultation, bilaterally without Rales/Wheezes/Rhonchi.   Cardiovascular: IM or IV 50 mg/kg IM or IV    50 mg/kg IM or IV   Allergic to Penicillins or ampicillin-Oral regimen Cephalexin 2  OR  Clindamycin  OR  Azithromycin or clarithromycin 2 g    600 mg    500 mg 50 mg/kg    20 mg/kg    15 mg/kg   Allergic to penicillin or ampicillin and unable to take oral medications Cefazolin or ceftriaxone 3  OR  Clindamycin 1 g IM or IV    600 mg IM or IV 50 mg/kg IM or IV    20 mg/kg IM or IV   1IM - intramuscular; IV - intravenous  2Or other first or second generation oral cephalosporin in equivalent adult or pediatric dosage.  3Cephalosporins should not be used in an individual with a history of anaphylaxis, angioedema or urticaria with penicillin or ampicillin.   Adapted from Prevention of Infective Endocarditis: Guidelines From the American Heart Association, by the Committee on Rheumatic Fever, Endocarditis, and Kawasaki Disease. Circulation, e-published April 19, 2007. Go to www.americanheart.org/presenter for more information.    The practice of giving patients antibiotics prior to a dental procedure is no longer recommended EXCEPT for patients with the highest risk of adverse outcomes resulting from bacterial endocarditis. We cannot exclude the possibility that an exceedingly small number of cases, if any, of bacterial endocarditis may be prevented by antibiotic prophylaxis prior to a dental procedure. The importance of good oral and dental health and regular visits to the dentist is important for patients at risk for bacterial endocarditis.  Gastrointestinal (GI)/Genitourinary () Procedures: Antibiotic prophylaxis solely to prevent bacterial endocarditis is no longer recommended for patients who undergo a GI or  tract procedures, including patients with the highest risk of adverse outcomes due to bacterial endocarditis.    Good dental health and hygiene is very effective in preventing bacterial endocarditis.   Always practice good dental health!    Regular rate and rhythm with normal S1/S2 without murmurs, rubs or gallops. Abdomen: Soft, non-tender, non-distended with normal bowel sounds. Musculoskeletal: No clubbing, cyanosis or edema bilaterally. Full range of motion without deformity. Skin: Skin color, texture, turgor normal.  No rashes or lesions. Neurologic:  Neurovascularly intact without any focal sensory/motor deficits. Cranial nerves: II-XII intact, grossly non-focal.  Psychiatric: Alert and oriented, thought content appropriate, normal insight  Capillary Refill: Brisk,3 seconds, normal   Peripheral Pulses: +2 palpable, equal bilaterally       Labs:   Recent Labs     06/25/21  1244 06/26/21  0438   WBC 9.7 8.7   HGB 11.0* 10.9*   HCT 35.9* 35.1*    283     Recent Labs     06/25/21  1244 06/26/21  0438    142   K 3.8 3.6    105   CO2 25 29   BUN 13 8   CREATININE 0.9 0.7*   CALCIUM 9.3 8.5   PHOS  --  3.0     Recent Labs     06/25/21  1244   AST 28   ALT 45*   BILIDIR <0.2   BILITOT 0.4   ALKPHOS 90     No results for input(s): INR in the last 72 hours. Recent Labs     06/25/21  1244   TROPONINI <0.01       Urinalysis:      Lab Results   Component Value Date    NITRU Negative 06/25/2021    WBCUA 4 06/25/2021    RBCUA 1 06/25/2021    BLOODU Negative 06/25/2021    SPECGRAV 1.027 06/25/2021    GLUCOSEU Negative 06/25/2021       Radiology:  XR CHEST PORTABLE   Final Result   Endotracheal tube and enteric tube in satisfactory position. No change in increased interstitial markings which may be due to vascular   crowding from low lung volumes versus pulmonary vascular congestion. Atypical pneumonia cannot be excluded. CT Head WO Contrast   Final Result   1. No acute intracranial abnormality.          XR CHEST PORTABLE   Final Result   Faint bilateral interstitial opacities favored to be vascular congestion and   possibly mild superimposed interstitial edema although infection or   aspiration could potentially have chest x-ray appearance.                  Assessment/Plan:    Acute encephalopathy secondary to drug overdose  Acute hypoxic respiratory failure  Continue ventilator, wean as appropriate  Continue to monitor for mental status improvement after weaning sedation     Bilateral lower extremity edema   Echo with EF 31-07%, no diastolic dysfunction noted     Lactic acidosis  Improved     Hypertension  Patient has been noncompliant with his medication  Continue monitor blood pressure      Polysubstance abuse  Counseling when appropriate  UDS positive for amphetamines, benzodiazepines, methadone and PCP     Microcytic anemia  Check iron panel     DVT Prophylaxis: Lovenox  Diet: Diet NPO  Code Status: Full Code     Dispo -continue ICU care    Yehuda Cao MD